# Patient Record
Sex: FEMALE | Race: WHITE | Employment: FULL TIME | URBAN - METROPOLITAN AREA
[De-identification: names, ages, dates, MRNs, and addresses within clinical notes are randomized per-mention and may not be internally consistent; named-entity substitution may affect disease eponyms.]

---

## 2017-06-12 ENCOUNTER — HOSPITAL ENCOUNTER (EMERGENCY)
Age: 37
Discharge: HOME | End: 2017-06-12
Attending: EMERGENCY MEDICINE
Payer: SELF-PAY

## 2017-06-12 VITALS
SYSTOLIC BLOOD PRESSURE: 141 MMHG | HEART RATE: 87 BPM | HEIGHT: 66 IN | WEIGHT: 145 LBS | TEMPERATURE: 98.2 F | RESPIRATION RATE: 16 BRPM | OXYGEN SATURATION: 97 % | BODY MASS INDEX: 23.3 KG/M2 | DIASTOLIC BLOOD PRESSURE: 72 MMHG

## 2017-06-12 DIAGNOSIS — R07.0 THROAT PAIN: Primary | ICD-10-CM

## 2017-06-12 PROCEDURE — 76010000093 HC SPECIAL PROCEDURE

## 2017-06-12 PROCEDURE — 99283 EMERGENCY DEPT VISIT LOW MDM: CPT

## 2017-06-12 PROCEDURE — 74011000250 HC RX REV CODE- 250: Performed by: EMERGENCY MEDICINE

## 2017-06-12 PROCEDURE — 74011250637 HC RX REV CODE- 250/637: Performed by: EMERGENCY MEDICINE

## 2017-06-12 PROCEDURE — 77030037378 HC BRONCHOSCOPE DISP TRAN -D

## 2017-06-12 RX ORDER — OXYMETAZOLINE HCL 0.05 %
2 SPRAY, NON-AEROSOL (ML) NASAL
Status: DISCONTINUED | OUTPATIENT
Start: 2017-06-12 | End: 2017-06-12 | Stop reason: HOSPADM

## 2017-06-12 RX ADMIN — LIDOCAINE HYDROCHLORIDE 40 ML: 20 SOLUTION ORAL; TOPICAL at 13:33

## 2017-06-12 NOTE — ED PROVIDER NOTES
HPI Comments: 44 y.o. female with past medical history significant for seizures who presents to the ED with chief complaint of throat pain. Pt reports she was eating a TV dinner with chicken 2 days ago (pulled the chicken off of the bone with a fork) when she felt like she got a piece lodged in her throat. Pt reports she was \"able to get a piece of the chicken up\" initially and then had an episode where she \"felt like she was unable to breathe and then vomited it all up. \" Pt reports she has had irritation in her throat since that is worse with swallowing. Pt states she is able to tolerate PO intake but it is painful to swallow (mostly on the right side). Pt states her voice is also hoarse. Denies any further episodes of vomiting. Pt states she has taken Tylenol and Aleve and gargled salt water without relief. Pt denies hemoptysis or hematemesis. There are no other acute medical complaints voiced at this time. PCP: Yunior Evans MD    Note written by Cheo Lynn, as dictated by Owen Tolliver MD 1:18 PM     The history is provided by the patient. Past Medical History:   Diagnosis Date    Hx of headache     Seizures ([address]        Past Surgical History:   Procedure Laterality Date    HX APPENDECTOMY      HX GYN      tubal ligation         History reviewed. No pertinent family history. Social History     Social History    Marital status: UNKNOWN     Spouse name: N/A    Number of children: N/A    Years of education: N/A     Occupational History    Not on file. Social History Main Topics    Smoking status: Current Every Day Smoker    Smokeless tobacco: Not on file    Alcohol use No    Drug use: Not on file    Sexual activity: Not on file     Other Topics Concern    Not on file     Social History Narrative         ALLERGIES: Review of patient's allergies indicates no known allergies. Review of Systems   Constitutional: Negative.   Negative for appetite change, fever and unexpected weight change. HENT: Positive for voice change (hoarse). Negative for ear pain, hearing loss, nosebleeds and rhinorrhea.         +throat irritation  +pain with swallowing   Respiratory: Negative. Negative for cough and chest tightness. Cardiovascular: Negative. Negative for chest pain and palpitations. Gastrointestinal: Negative. Negative for abdominal distention, abdominal pain and blood in stool. Endocrine: Negative. Genitourinary: Negative for dysuria and hematuria. Musculoskeletal: Negative. Negative for back pain and myalgias. Skin: Negative. Negative for rash. Allergic/Immunologic: Negative. Neurological: Negative. Negative for dizziness, syncope, weakness and numbness. Hematological: Negative. Psychiatric/Behavioral: Negative. All other systems reviewed and are negative. Vitals:    06/12/17 1248   BP: 141/72   Pulse: 87   Resp: 16   Temp: 98.2 °F (36.8 °C)   SpO2: 97%   Weight: 65.8 kg (145 lb)   Height: 5' 6\" (1.676 m)            Physical Exam   Constitutional: She is oriented to person, place, and time. She appears well-developed and well-nourished. No distress. HENT:   Head: Normocephalic and atraumatic. Right Ear: External ear normal.   Left Ear: External ear normal.   Nose: Nose normal.   Mouth/Throat: Oropharynx is clear and moist.   Eyes: Conjunctivae and EOM are normal. Pupils are equal, round, and reactive to light. Neck: Normal range of motion. Neck supple. No JVD present. No thyromegaly present. Cardiovascular: Normal rate, regular rhythm, normal heart sounds and intact distal pulses. No murmur heard. Pulmonary/Chest: Effort normal and breath sounds normal. No respiratory distress. She has no wheezes. She has no rales. Abdominal: Soft. Bowel sounds are normal. She exhibits no distension. There is no tenderness. Musculoskeletal: Normal range of motion. She exhibits no edema.    Neurological: She is alert and oriented to person, place, and time. No cranial nerve deficit. Skin: Skin is warm and dry. No rash noted. Psychiatric: She has a normal mood and affect. Her behavior is normal. Thought content normal.   Nursing note and vitals reviewed. Note written by Cheo Alvarado, as dictated by Suhas Pitts MD 1:19 PM    [facility]  ED Course       Procedures    PROGRESS NOTE:  2:41 PM   Performed bedside fiberoptic nasopharyngoscopy. Entire larynx visualized. No foreign bodies noted. No obvious tissue inflammation. Vocal cords, epiglottis, and surrounding structures show no edema, erythema, or asymmetry. A/P: Throat irritation following food bolus impaction. Will discharge home with magic mouthwash and f/u with ENT in 1 week if still symptomatic.

## 2018-05-02 ENCOUNTER — OFFICE VISIT (OUTPATIENT)
Dept: FAMILY MEDICINE CLINIC | Age: 38
End: 2018-05-02

## 2018-05-02 VITALS
HEIGHT: 66 IN | WEIGHT: 147.5 LBS | DIASTOLIC BLOOD PRESSURE: 72 MMHG | OXYGEN SATURATION: 97 % | HEART RATE: 86 BPM | TEMPERATURE: 98.1 F | SYSTOLIC BLOOD PRESSURE: 126 MMHG | RESPIRATION RATE: 16 BRPM | BODY MASS INDEX: 23.7 KG/M2

## 2018-05-02 DIAGNOSIS — R51.9 RIGHT FACIAL PAIN: Primary | ICD-10-CM

## 2018-05-02 DIAGNOSIS — G43.809 OTHER MIGRAINE WITHOUT STATUS MIGRAINOSUS, NOT INTRACTABLE: ICD-10-CM

## 2018-05-02 DIAGNOSIS — R56.9 SEIZURE (HCC): ICD-10-CM

## 2018-05-02 PROBLEM — G43.909 MIGRAINE: Status: ACTIVE | Noted: 2018-05-02

## 2018-05-02 RX ORDER — NAPROXEN 500 MG/1
500 TABLET ORAL 2 TIMES DAILY WITH MEALS
Qty: 60 TAB | Refills: 2 | Status: SHIPPED | OUTPATIENT
Start: 2018-05-02 | End: 2021-04-19 | Stop reason: ALTCHOICE

## 2018-05-02 RX ORDER — CEPHALEXIN 500 MG/1
1000 CAPSULE ORAL 2 TIMES DAILY
Qty: 40 CAP | Refills: 0 | Status: SHIPPED | OUTPATIENT
Start: 2018-05-02 | End: 2018-05-12

## 2018-05-02 NOTE — PROGRESS NOTES
Chief Complaint   Patient presents with    Facial Swelling    Nausea     dizziness, restless leg    Migraine     1. Have you been to the ER, urgent care clinic since your last visit? Hospitalized since your last visit? No    2. Have you seen or consulted any other health care providers outside of the [facility] since your last visit? Include any pap smears or colon screening. No      Has hx of szs and ? Having episode, no rx and not seen neuro  In years    R facial swelling and tooth pain      Chief Complaint   Patient presents with    Facial Swelling    Nausea     dizziness, restless leg    Migraine     She is a 43 y.o. female who presents for evalution. Reviewed PmHx, RxHx, FmHx, SocHx, AllgHx and updated and dated in the chart. Patient Active Problem List    Diagnosis    Seizure ([address]    Migraine       Review of Systems - negative except as listed above in the HPI    Objective:     Vitals:    05/02/18 1516   BP: 126/72   Pulse: 86   Resp: 16   Temp: 98.1 °F (36.7 °C)   TempSrc: Oral   SpO2: 97%   Weight: 147 lb 8 oz (66.9 kg)   Height: 5' 6\" (1.676 m)     Physical Examination: General appearance - alert, well appearing, and in no distress  R max sinus tender     Assessment/ Plan:   Diagnoses and all orders for this visit:    1. Right facial pain  -     cephALEXin (KEFLEX) 500 mg capsule; Take 2 Caps by mouth two (2) times a day for 10 days. 2. Other migraine without status migrainosus, not intractable  -     [facility] Neurology ref [facility]  -     naproxen (NAPROSYN) 500 mg tablet; Take 1 Tab by mouth two (2) times daily (with meals). 3. Seizure ([address]  -     [facility] Neurology ref [facility]       Follow-up Disposition:  Return if symptoms worsen or fail to improve. I have discussed the diagnosis with the patient and the intended plan as seen in the above orders. The patient understands and agrees with the plan.  The patient has received an after-visit summary and questions were answered concerning future plans. Medication Side Effects and Warnings were discussed with patient  Patient Labs were reviewed and or requested:  Patient Past Records were reviewed and or requested    Shiva Fatima M.D. There are no Patient Instructions on file for this visit.

## 2018-05-02 NOTE — MR AVS SNAPSHOT
[address] [address] 
[phone] Patient: Roz Olivera MRN: [id number] :1980 Visit Information Date & Time Provider Department Dept. Phone Encounter #  
 2018  2:40 PM An Mishra MD [address] [facility] [phone] [id number] Follow-up Instructions Return if symptoms worsen or fail to improve. Upcoming Health Maintenance Date Due Pneumococcal 19-64 Medium Risk (1 of 1 - PPSV23) 1998 DTaP/Tdap/Td series (1 - Tdap) 2000 PAP AKA CERVICAL CYTOLOGY 2000 Influenza Age 5 to Adult 2018 Allergies as of 2018  Review Complete On: 2018 By: An Mishra MD  
 No Known Allergies Current Immunizations  Reviewed on 2016 No immunizations on file. Not reviewed this visit You Were Diagnosed With   
  
 Codes Comments Right facial pain    -  Primary ICD-10-CM: W29 ICD-9-CM: 784.0 Other migraine without status migrainosus, not intractable     ICD-10-CM: C74.709 ICD-9-CM: 346.80 Seizure ([facility] [address]     ICD-10-CM: R56.9 ICD-9-CM: 780.39 Vitals BP Pulse Temp Resp Height(growth percentile) Weight(growth percentile) 126/72 86 98.1 °F (36.7 °C) (Oral) 16 5' 6\" (1.676 m) 147 lb 8 oz (66.9 kg) LMP SpO2 BMI OB Status Smoking Status 2018 97% 23.81 kg/m2 Having regular periods Light Tobacco Smoker Vitals History BMI and BSA Data Body Mass Index Body Surface Area  
 23.81 kg/m 2 1.77 m 2 Preferred Pharmacy Pharmacy Name Phone [facility], [address] [phone] Your Updated Medication List  
  
   
This list is accurate as of 18  3:39 PM.  Always use your most recent med list.  
  
  
  
  
 cephALEXin 500 mg capsule Commonly known as:  Hanane Mcknezie Take 2 Caps by mouth two (2) times a day for 10 days. Prescriptions Sent to Pharmacy Refills  
 cephALEXin (KEFLEX) 500 mg capsule 0 Sig: Take 2 Caps by mouth two (2) times a day for 10 days. Class: Normal  
 Pharmacy: [facility] [address] [place] [facility], [address]  #: [phone] Route: Oral  
  
We Performed the Following REFERRAL TO NEUROLOGY [SCC18 Custom] Follow-up Instructions Return if symptoms worsen or fail to improve. Referral Information Referral ID Referred By Referred To  
  
 [id number] Antony LOPEZ MD [address], [address] [facility] Phone: [phone] Fax: [phone] Visits Status Start Date End Date 1 New Request 5/2/18 5/2/19 If your referral has a status of pending review or denied, additional information will be sent to support the outcome of this decision. Introducing [facility]! Dear Lizette Schroeder: Thank you for requesting a [employer] account. Our records indicate that you already have an active [employer] account. You can access your account anytime at [email or web address] Did you know that you can access your hospital and ER discharge instructions at any time in [employer]? You can also review all of your test results from your hospital stay or ER visit. Additional Information If you have questions, please visit the Frequently Asked Questions section of the [employer] website at [email or web address] Remember, [employer] is NOT to be used for urgent needs. For medical emergencies, dial 911. Now available from your iPhone and Android! Please provide this summary of care documentation to your next provider. Your primary care clinician is listed as WARD LOPEZ. If you have any questions after today's visit, please call [phone].

## 2018-05-21 ENCOUNTER — OFFICE VISIT (OUTPATIENT)
Dept: NEUROLOGY | Age: 38
End: 2018-05-21

## 2018-05-21 VITALS
DIASTOLIC BLOOD PRESSURE: 80 MMHG | WEIGHT: 143 LBS | BODY MASS INDEX: 22.98 KG/M2 | SYSTOLIC BLOOD PRESSURE: 130 MMHG | OXYGEN SATURATION: 96 % | RESPIRATION RATE: 14 BRPM | HEART RATE: 80 BPM | HEIGHT: 66 IN

## 2018-05-21 DIAGNOSIS — G43.109 MIGRAINE WITH AURA AND WITHOUT STATUS MIGRAINOSUS, NOT INTRACTABLE: Primary | ICD-10-CM

## 2018-05-21 DIAGNOSIS — R42 VERTIGO: ICD-10-CM

## 2018-05-21 DIAGNOSIS — Z86.69 HISTORY OF EPILEPSY: ICD-10-CM

## 2018-05-21 RX ORDER — TOPIRAMATE 50 MG/1
100 TABLET, FILM COATED ORAL DAILY
Qty: 60 TAB | Refills: 5 | Status: SHIPPED | OUTPATIENT
Start: 2018-05-21 | End: 2018-08-27 | Stop reason: SDUPTHER

## 2018-05-21 NOTE — MR AVS SNAPSHOT
[address] [id number] [facility] [address] 
[phone] Patient: Belgica Rogers MRN: [id number] :1980 Visit Information Date & Time Provider Department Dept. Phone Encounter #  
 2018  1:00 PM Kerri Ward MD [facility] [phone] [id number] Follow-up Instructions Return in about 3 months (around 2018). Your Appointments 2018  2:40 PM  
Follow Up with Kerri Ward MD  
[address] [facility] [address] [facility] [phone] [address]  
[facility] [address] [address] Upcoming Health Maintenance Date Due Pneumococcal 19-64 Medium Risk (1 of 1 - PPSV23) 1998 DTaP/Tdap/Td series (1 - Tdap) 2000 PAP AKA CERVICAL CYTOLOGY 2000 Influenza Age 5 to Adult 2018 Allergies as of 2018  Review Complete On: 2018 By: Ash Cuevas MD  
 No Known Allergies Current Immunizations  Reviewed on 2016 No immunizations on file. Not reviewed this visit You Were Diagnosed With   
  
 Codes Comments Migraine with aura and without status migrainosus, not intractable    -  Primary ICD-10-CM: G43.109 ICD-9-CM: 346.00 Vertigo     ICD-10-CM: Q30 ICD-9-CM: 780.4 History of epilepsy     ICD-10-CM: Z86.69 
ICD-9-CM: V12.49 Vitals BP Height(growth percentile) Weight(growth percentile) LMP BMI OB Status 130/80 5' 6\" (1.676 m) 143 lb (64.9 kg) 2018 23.08 kg/m2 Having regular periods Smoking Status Light Tobacco Smoker Vitals History BMI and BSA Data Body Mass Index Body Surface Area 23.08 kg/m 2 1.74 m 2 Preferred Pharmacy Pharmacy Name Phone [facility] [place] [facility], [address] [phone] Your Updated Medication List  
  
   
This list is accurate as of 5/21/18  1:50 PM.  Always use your most recent med list.  
  
  
  
  
 naproxen 500 mg tablet Commonly known as:  NAPROSYN Take 1 Tab by mouth two (2) times daily (with meals). topiramate 50 mg tablet Commonly known as:  TOPAMAX Take 2 Tabs by mouth daily. Prescriptions Printed Refills  
 topiramate (TOPAMAX) 50 mg tablet 5 Sig: Take 2 Tabs by mouth daily. Class: Print Route: Oral  
  
We Performed the Following CBC WITH AUTOMATED DIFF [87705 CPT(R)] METABOLIC PANEL, COMPREHENSIVE [16696 CPT(R)] VITAMIN B12 & FOLATE [96627 CPT(R)] Follow-up Instructions Return in about 3 months (around 8/21/2018). To-Do List   
 05/22/2018 Neurology:  EEG   
  
 05/22/2018 Imaging:  MRI BRAIN W WO CONT   
  
 05/22/2018 Lab:  TSH 3RD GENERATION Patient Instructions Oleksandr Rayo 1399 What is a living will? A living will is a legal form you use to write down the kind of care you want at the end of your life. It is used by the health professionals who will treat you if you aren't able to decide for yourself. If you put your wishes in writing, your loved ones and others will know what kind of care you want. They won't need to guess. This can ease your mind and be helpful to others. A living will is not the same as an estate or property will. An estate will explains what you want to happen with your money and property after you die. Is a living will a legal document? A living will is a legal document. Each state has its own laws about living ledesma. If you move to another state, make sure that your living will is legal in the state where you now live. Or you might use a universal form that has been approved by many states. This kind of form can sometimes be completed and stored online.  Your electronic copy will then be available wherever you have a connection to the Internet. In most cases, doctors will respect your wishes even if you have a form from a different state. · You don't need an  to complete a living will. But legal advice can be helpful if your state's laws are unclear, your health history is complicated, or your family can't agree on what should be in your living will. · You can change your living will at any time. Some people find that their wishes about end-of-life care change as their health changes. · In addition to making a living will, think about completing a medical power of  form. This form lets you name the person you want to make end-of-life treatment decisions for you (your \"health care agent\") if you're not able to. Many hospitals and nursing homes will give you the forms you need to complete a living will and a medical power of . · Your living will is used only if you can't make or communicate decisions for yourself anymore. If you become able to make decisions again, you can accept or refuse any treatment, no matter what you wrote in your living will. · Your state may offer an online registry. This is a place where you can store your living will online so the doctors and nurses who need to treat you can find it right away. What should you think about when creating a living will? Talk about your end-of-life wishes with your family members and your doctor. Let them know what you want. That way the people making decisions for you won't be surprised by your choices. Think about these questions as you make your living will: · Do you know enough about life support methods that might be used? If not, talk to your doctor so you know what might be done if you can't breathe on your own, your heart stops, or you're unable to swallow.  
· What things would you still want to be able to do after you receive life-support methods? Would you want to be able to walk? To speak? To eat on your own? To live without the help of machines? · If you have a choice, where do you want to be cared for? In your home? At a hospital or nursing home? · Do you want certain Latter day practices performed if you become very ill? · If you have a choice at the end of your life, where would you prefer to die? At home? In a hospital or nursing home? Somewhere else? · Would you prefer to be buried or cremated? · Do you want your organs to be donated after you die? What should you do with your living will? · Make sure that your family members and your health care agent have copies of your living will. · Give your doctor a copy of your living will to keep in your medical record. If you have more than one doctor, make sure that each one has a copy. · You may want to put a copy of your living will where it can be easily found. Where can you learn more? Go to http://jessica-handy.info/. Enter A652 in the search box to learn more about \"Learning About Living Perroeliel. \" Current as of: September 24, 2016 Content Version: 11.4 © 1980-8638 [employer]. Care instructions adapted under license by [employer] (which disclaims liability or warranty for this information). If you have questions about a medical condition or this instruction, always ask your healthcare professional. [place] [address] any warranty or liability for your use of this information. Advance Directives: Care Instructions Your Care Instructions An advance directive is a legal way to state your wishes at the end of your life. It tells your family and your doctor what to do if you can no longer say what you want. There are two main types of advance directives. You can change them any time that your wishes change.  
· A living will tells your family and your doctor your wishes about life support and other treatment. · A durable power of  for health care lets you name a person to make treatment decisions for you when you can't speak for yourself. This person is called a health care agent. If you do not have an advance directive, decisions about your medical care may be made by a doctor or a  who doesn't know you. It may help to think of an advance directive as a gift to the people who care for you. If you have one, they won't have to make tough decisions by themselves. Follow-up care is a key part of your treatment and safety. Be sure to make and go to all appointments, and call your doctor if you are having problems. It's also a good idea to know your test results and keep a list of the medicines you take. How can you care for yourself at home? · Discuss your wishes with your loved ones and your doctor. This way, there are no surprises. · Many states have a unique form. Or you might use a universal form that has been approved by many states. This kind of form can sometimes be completed and stored online. Your electronic copy will then be available wherever you have a connection to the Internet. In most cases, doctors will respect your wishes even if you have a form from a different state. · You don't need a  to do an advance directive. But you may want to get legal advice. · Think about these questions when you prepare an advance directive: ¨ Who do you want to make decisions about your medical care if you are not able to? Many people choose a family member or close friend. ¨ Do you know enough about life support methods that might be used? If not, talk to your doctor so you understand. ¨ What are you most afraid of that might happen? You might be afraid of having pain, losing your independence, or being kept alive by machines. ¨ Where would you prefer to die? Choices include your home, a hospital, or a nursing home. ¨ Would you like to have information about hospice care to support you and your family? ¨ Do you want to donate organs when you die? ¨ Do you want certain Mormonism practices performed before you die? If so, put your wishes in the advance directive. · Read your advance directive every year, and make changes as needed. When should you call for help? Be sure to contact your doctor if you have any questions. Where can you learn more? Go to http://jessica-handy.info/. Enter R264 in the search box to learn more about \"Advance Directives: Care Instructions. \" Current as of: September 24, 2016 Content Version: 11.4 © 6251-9569 [employer]. Care instructions adapted under license by [employer] (which disclaims liability or warranty for this information). If you have questions about a medical condition or this instruction, always ask your healthcare professional. [place] [address] any warranty or liability for your use of this information. PRESCRIPTION REFILL POLICY [facility] Statement to Patients April 1, 2014 In an effort to ensure the large volume of patient prescription refills is processed in the most efficient and expeditious manner, we are asking our patients to assist us by calling your Pharmacy for all prescription refills, this will include also your  Mail Order Pharmacy. The pharmacy will contact our office electronically to continue the refill process. Please do not wait until the last minute to call your pharmacy. We need at least 48 hours (2days) to fill prescriptions. We also encourage you to call your pharmacy before going to  your prescription to make sure it is ready.   
 
With regard to controlled substance prescription refill requests (narcotic refills) that need to be picked up at our office, we ask your cooperation by providing us with at least 72 hours (3days) notice that you will need a refill. We will not refill narcotic prescription refill requests after 4:00pm on any weekday, Monday through Thursday, or after 2:00pm on Fridays, or on the weekends. We encourage everyone to explore another way of getting your prescription refill request processed using [employer], our patient web portal through our electronic medical record system. [employer]ato is an efficient and effective way to communicate your medication request directly to the office and  downloadable as an frankie on your smart phone . [employer] also features a review functionality that allows you to view your medication list as well as leave messages for your physician. Are you ready to get connected? If so please review the attatched instructions or speak to any of our staff to get you set up right away! Thank you so much for your cooperation. Should you have any questions please contact our Practice Administrator. The Physicians and Staff,  [facility] Patient Instruction Plan/ Result Policy If we have ordered testing for you, know that; \"NO NEWS IS GOOD NEWS! \" It is our policy that we know longer call patients with results, nor do we  give test results over the phone. We schedule follow up appointments so that your results can be discussed in person. This allows you to address any questions you have regarding the results. If something of concern is revealed on your test, we will contact you to discuss the matter and if needed schedule a sooner follow up appointment. Additionally, results may be found by using the My Chart feature and one of our patient service representatives at the  can give you instructions on how to access this feature to utilize our electronic medical record system. Thank you for your understanding. Topamax 50mg tabs Take 1/2 tab at night for one week then Take one tab at night for one week then Take one and 1/2  tabs at night for one week then Take two tabs at night If you have issues with this medication or need an increase in the dosage please call the office for further instructions Topiramate (By mouth) Topiramate (toe-PIR-a-mate) Treats and prevents seizures, and helps prevent migraine headaches. Brand Name(s): Qudexy XR, Topamax, Trokendi XR There may be other brand names for this medicine. When This Medicine Should Not Be Used: This medicine is not right for everyone. Do not use it if you had an allergic reaction to topiramate, or if you are pregnant. How to Use This Medicine:  
Capsule, Long Acting Capsule, Tablet · Take your medicine as directed. Your dose may need to be changed several times to find what works best for you. · Tablet: Swallow whole. Do not break, crush, or chew the tablet. It has a very bitter taste. · Capsule or extended-release capsule: Do not crush or chew the capsule. Swallow whole or open the capsule and pour the medicine into a small amount (1 teaspoon) of soft food, such as applesauce. Swallow the mixture right away without chewing. Do not store the mixture for use at a later time. · Drink extra fluids so you will urinate more often and help prevent kidney problems. · This medicine should come with a Medication Guide. Ask your pharmacist for a copy if you do not have one. · Missed dose: Take a dose as soon as you remember. If it is almost time for your next dose, wait until then and take a regular dose. Do not take extra medicine to make up for a missed dose. If you miss a dose or forget to use your medicine, use it as soon as you can. If your next regular dose of Topamax® is less than 6 hours away, wait until then to use the medicine and skip the missed dose. If you miss more than 1 dose of Topamax®, call your doctor for instructions. · Store the medicine in a closed container at room temperature, away from heat, moisture, and direct light. Drugs and Foods to Avoid: Ask your doctor or pharmacist before using any other medicine, including over-the-counter medicines, vitamins, and herbal products. · Do not drink alcohol with Qudexy XR or Topamax®. Do not drink alcohol for 6 hours before and 6 hours after you take the Trokendi XR capsule. · Some medicines can affect how topiramate works. Tell your doctor if you are using acetazolamide, dichlorphenamide, dichloralphenazone, digoxin, lithium, metformin, zonisamide, other medicine for seizures (such as carbamazepine, phenytoin, valproic acid), or birth control pills. · Tell your doctor if you are using any medicine that makes you sleepy, such as allergy medicine or narcotic pain medicine. Warnings While Using This Medicine: · It is not safe to take this medicine during pregnancy. It could harm an unborn baby. Tell your doctor right away if you become pregnant. · Tell your doctor if you are breastfeeding, or if you have kidney disease, liver disease, glaucoma, lung or breathing problems, osteoporosis, or a history of depression or mood disorders. Tell your doctor if you are on a ketogenic diet (high in fat and low in carbohydrates). · This medicine may cause the following problems: ¨ Eye pain or vision changes, including glaucoma ¨ Changes in body temperature ¨ Metabolic acidosis (too much acid in the blood) ¨ Kidney stones · This medicine may increase depression or thoughts of suicide. Tell your doctor right away if you start to feel more depressed or think about hurting yourself. · This medicine may make you dizzy, drowsy, or tired. Do not drive or do anything else that could be dangerous until you know how this medicine affects you. · Do not stop using this medicine suddenly. Your doctor will need to slowly decrease your dose before you stop it completely. · Your doctor will do lab tests at regular visits to check on the effects of this medicine. Keep all appointments. · Keep all medicine out of the reach of children. Never share your medicine with anyone. Possible Side Effects While Using This Medicine:  
Call your doctor right away if you notice any of these side effects: · Allergic reaction: Itching or hives, swelling in your face or hands, swelling or tingling in your mouth or throat, chest tightness, trouble breathing · Bloody or cloudy urine, painful urination, sudden lower back or stomach pain · Changes in vision, eye pain · Confusion, problems with walking, clumsiness, dizziness, or trouble talking, concentrating, or remembering · Feeling agitated, depressed, nervous, or irritable, thoughts of hurting yourself or others, unusual mood or behavior · Fever, decreased sweating · Numbness, tingling, or burning pain in your hands, arms, legs, or feet · Rapid, deep breathing, loss of appetite, fast or uneven heartbeat · Vomiting, unusual drowsiness, tiredness, or weakness If you notice these less serious side effects, talk with your doctor: · Change in taste · Nausea, diarrhea · Stuffy or runny nose · Weight loss If you notice other side effects that you think are caused by this medicine, tell your doctor. Call your doctor for medical advice about side effects. You may report side effects to FDA at 2-870-YXS-5235 © 2017 [facility] Information is for End User's use only and may not be sold, redistributed or otherwise used for commercial purposes. The above information is an  only. It is not intended as medical advice for individual conditions or treatments. Talk to your doctor, nurse or pharmacist before following any medical regimen to see if it is safe and effective for you. Introducing [facility]! Dear Vickie Pierre: Thank you for requesting a [employer] account. Our records indicate that you already have an active [employer] account. You can access your account anytime at [email or web address]. [email or web address]/[email or web address] Did you know that you can access your hospital and ER discharge instructions at any time in [employer]? You can also review all of your test results from your hospital stay or ER visit. Additional Information If you have questions, please visit the Frequently Asked Questions section of the [employer] website at [email or web address] Remember, [employer] is NOT to be used for urgent needs. For medical emergencies, dial 911. Now available from your iPhone and Android! Please provide this summary of care documentation to your next provider. Your primary care clinician is listed as WARD LOPEZ. If you have any questions after today's visit, please call [phone].

## 2018-05-21 NOTE — LETTER
Dear Garry Ordoñez MD, Thank you for allowing me to see your patient, Suresh Lawson for a neurological consultation. Please see my impression and recommendations as outlined in my note. Sincerely, Harshad Gramajo MD 
[facility] at [facility] Complaint Patient presents with  Seizure  
  feels like she is floating  Head Pain Reviewed record in preparation for visit and have necessary documentation Pt did not bring medication to office visit for review Medication list reviewed and reconciled with patient Information was given to pt on Advanced Directives, Living Will 
opportunity was given for questions NEUROLOGY NEW PATIENT CONSULTATION 
 
REFERRED BY: 
Garry Ordoñez MD 
 
CHIEF COMPLAINT: 
Migraines, seizures, dizziness HISTORY OF PRESENT ILLNESS HISTORY PROVIDED BY: 
Patient Suresh Lawson is a 43 y.o. female who I am asked to see in consultation for migraines, seizures, dizziness. Patient reports a history of epilepsy as a child. She reports this started when she was an infant and lasted until age 2. She was treated by Dr. Toro Manning at [facility]. She was on phenobarbital and Dilantin. At some point in her childhood she was weaned off of this but she is not sure the exact details. She knows that she did have a workup at that time but is not sure what was the cause of her seizures. She does have a son with a history of febrile seizures. No other epilepsy risk factors. As a teenager she did experience migraine headaches. They were mostly hormonally related. In 2007 during her last pregnancy she started having much more difficulty with dizzy spells and headaches. During the pregnancy she had an episode where she felt like her stomach was rising and then she threw herself to the side on a table and tried to call for help and cannot speak for up to 2 minutes.   She denies having any tonic-clonic activity. The baby was checked that was okay. She has not been on any seizure medication or had any other workup at that time. Since that pregnancy she is continued to have dizzy spells, headaches, and spells. Her headaches would normally last 1-2 days but now she is having in 3-4 times per week. They are associated with nausea and vomiting and photophobia. Pain is a 10 out of 10. It can be all over her head. She will go to bed with the pain and still wake up with it. She does experience an aura weight light on the top half of one eye or spots in her vision or shooting stars. This does not occur with every migraine but does occur frequently enough that she notices it. She also has spells where she will see and hear things but her brain will shut off she cannot retain information. The last 5 months her period is changed from 5 days a month to 2 days and with that she has had more frequent headaches. She is also had an episode of a sensation of falling as well as eye twitching. She is a video today showing her left eye deviating and twitching and she reports this happened up 20 times in that particular day. It has not happened since. She has not had any recent imaging or EEGs. She was placed on naproxen daily by her PCP 2 weeks ago. She has not been on any preventative medications or antiepileptic medications. Patient does work as a . She does not drive. 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 Today she denies any focal numbness or weakness. She has had a recent eye exam that was normal. 
PMH Past Medical History:  
Diagnosis Date  Hx of headache  Seizures ([address] Migraine headaches [facility] Social History Social History  Marital status:  Spouse name: N/A  
 Number of children: N/A  
 Years of education: N/A Social History Main Topics  Smoking status: Light Tobacco Smoker  Smokeless tobacco: Never Used  Alcohol use No  
 Drug use: Not on file  Sexual activity: Not on file Other Topics Concern  Not on file Social History Narrative Steve Couch Family history of sudden with febrile seizures ALLERGIES No Known Allergies CURRENT MEDS Current Outpatient Prescriptions Medication Sig Dispense Refill  topiramate (TOPAMAX) 50 mg tablet Take 2 Tabs by mouth daily. 60 Tab 5  
 naproxen (NAPROSYN) 500 mg tablet Take 1 Tab by mouth two (2) times daily (with meals). 60 Tab 2 REVIEW OF SYSTEMS:  
 
Y  N       Y  N  Y  N   Y  N 
[] [x] AIDS          [] [x] Falls  [] [x] Memory Loss  [] [x]  Shortness of breath 
[] [x] Anxiety          [] [x] Fatigue [] [x] Muscle Pain        [] [x]  Skipped beats 
[] [x] Chest Pain   [x] [] Frequent HA [] [x] Ms Weakness     [] [x]  Snoring 
[] [x] Constipation [] [x]Hearing loss [] [x] Nause/Vomiting  [] [x]  Stomach Pain 
[] [x] Cough          [] [x]Hepatitis [] [x] Neuropathy         [] [x]  Swallowing difficulty 
[] [x] Depression  [] [x]Incontinence [] [x] Poor appetite      [x] []  Vertigo 
[] [x] Diarrhea       [] [x] Joint Pain [] [x] Rash                   [x] []  Visual disturbances 
[] [x] Fainting        [] [x] Leg Swelling [] [x] Ringing ears       [] [x]  Weight changes []Unable to obtain  ROS due to  []mental status change  []sedated   []intubated PREVIOUS WORKUP IMAGING: None LABS Results for orders placed or performed during the hospital encounter of 03/09/14 CBC WITH AUTOMATED DIFF Result Value Ref Range WBC 12.2 (H) 3.6 - 11.0 K/uL  
 RBC 3.87 3.80 - 5.20 M/uL  
 HGB 11.8 11.5 - 16.0 g/dL HCT 34.1 (L) 35.0 - 47.0 % MCV 88.1 80.0 - 99.0 FL  
 MCH 30.5 26.0 - 34.0 PG  
 MCHC 34.6 30.0 - 36.5 g/dL  
 RDW 13.0 11.5 - 14.5 % PLATELET 379 768 - 052 K/uL NEUTROPHILS 74 32 - 75 % LYMPHOCYTES 16 12 - 49 % MONOCYTES 9 5 - 13 % EOSINOPHILS 1 0 - 7 % BASOPHILS 0 0 - 1 % ABS. NEUTROPHILS 9.1 (H) 1.8 - 8.0 K/UL  
 ABS. LYMPHOCYTES 1.9 0.8 - 3.5 K/UL  
 ABS. MONOCYTES 1.1 (H) 0.0 - 1.0 K/UL  
 ABS. EOSINOPHILS 0.1 0.0 - 0.4 K/UL  
 ABS. BASOPHILS 0.0 0.0 - 0.1 K/UL PHYSICAL EXAM 
Visit Vitals  /80  Ht 5' 6\" (1.676 m)  Wt 64.9 kg (143 lb)  LMP 04/28/2018  BMI 23.08 kg/m2 General:  Alert, cooperative, no distress. Head:  Normocephalic, without obvious abnormality, atraumatic. Eyes:  Conjunctivae/corneas clear. Pupils equal, round, reactive to light. Extraocular movements intact, VFF, NO papilledema Lungs: 
Heart:   Non labored breathing Regular rate and rhythm, no carotid bruits Abdomen:   Soft, non-distended Extremities: Extremities normal, atraumatic, no cyanosis or edema. Pulses: 2+ and symmetric all extremities. Skin: Skin color, texture, turgor normal. No rashes or lesions. Neurologic:  Gen: Attention normal 
           Language: naming, repetition, fluency normal 
           Memory: intact recent and remote memory Cranial Nerves: 
I: smell Not tested II: visual fields Full to confrontation II: pupils Equal, round, reactive to light II: optic disc No papilledema III,VII: ptosis none III,IV,VI: extraocular muscles  Full ROM V: mastication normal  
V: facial light touch sensation  normal  
VII: facial muscle function   symmetric VIII: hearing symmetric IX: soft palate elevation  normal  
XI: trapezius strength  5/5 XI: sternocleidomastoid strength 5/5 XI: neck flexion strength  5/5 XII: tongue  midline Motor: normal bulk and tone, no tremor Strength: 5/5 all four extremities Sensory: Decreased pinprick in the right upper extremity and left lower extremity Coordination: FTN intact, Rhomberg negative Gait: normal gait including tandem Reflexes: 2+ throughout IMPRESSION Sol Shore is a 43 y.o. female who presents for evaluation of migraines, dizzy spells, and spells. She does have a history of epilepsy as a child. This is concerning that she may be having seizures again as an adult. Will do further evaluation with an EEG. Additionally she is having dizzy spells and migraine. I think this could be related. However, structural etiology needs to be evaluated for this as well. Will do an MRI of the brain to evaluate for any vascular or structural etiology of her progressive and worsening migraine headaches. We will also check lab work and start her on a preventative medication for migraines. RECOMMENDATIONS Problem List Items Addressed This Visit Migraine - Primary Relevant Medications · Will start topiramate (TOPAMAX) 50 mg tablet at one half tab and titrate to 100 mg. Side effects discussed · We will do naproxen for abortive but limited to no more than 3 times per week Other Relevant Orders · MRI BRAIN W WO CONT · EEG 
· May need to do ambulatory EEG or EMU evaluation · CBC WITH AUTOMATED DIFF  
 · METABOLIC PANEL, COMPREHENSIVE  
 · TSH 3RD GENERATION  
 · VITAMIN B12 & FOLATE Other Visit Diagnoses Vertigo Other Relevant Orders · MRI BRAIN W WO CONT · EEG  
 · CBC WITH AUTOMATED DIFF  
 · METABOLIC PANEL, COMPREHENSIVE  
 · TSH 3RD GENERATION  
 · VITAMIN B12 & FOLATE History of epilepsy Relevant Orders · MRI BRAIN W WO CONT · EEG  
  
 
 
FU in 3 months Pilar Jaime MD 
 
CC: Linh Roth MD 
Fax: [phone] Medications and side effects discussed with patient in detail. With any new medications prescribed, patient was given instructions on administration and side effects. Written medication information was provided to the patient as well. This note was created using voice recognition software. Despite editing, there may be syntax errors. This note will not be viewable in [address]

## 2018-05-21 NOTE — PROGRESS NOTES
NEUROLOGY NEW PATIENT CONSULTATION    REFERRED BY:  Linh Roth MD    CHIEF COMPLAINT:  Migraines, seizures, dizziness    HISTORY OF PRESENT ILLNESS    HISTORY PROVIDED BY:  Patient      Freda García is a 43 y.o. female who I am asked to see in consultation for migraines, seizures, dizziness. Patient reports a history of epilepsy as a child. She reports this started when she was an infant and lasted until age 2. She was treated by Dr. Katy Ji at [facility]. She was on phenobarbital and Dilantin. At some point in her childhood she was weaned off of this but she is not sure the exact details. She knows that she did have a workup at that time but is not sure what was the cause of her seizures. She does have a son with a history of febrile seizures. No other epilepsy risk factors. As a teenager she did experience migraine headaches. They were mostly hormonally related. In 2007 during her last pregnancy she started having much more difficulty with dizzy spells and headaches. During the pregnancy she had an episode where she felt like her stomach was rising and then she threw herself to the side on a table and tried to call for help and cannot speak for up to 2 minutes. She denies having any tonic-clonic activity. The baby was checked that was okay. She has not been on any seizure medication or had any other workup at that time. Since that pregnancy she is continued to have dizzy spells, headaches, and spells. Her headaches would normally last 1-2 days but now she is having in 3-4 times per week. They are associated with nausea and vomiting and photophobia. Pain is a 10 out of 10. It can be all over her head. She will go to bed with the pain and still wake up with it. She does experience an aura weight light on the top half of one eye or spots in her vision or shooting stars. This does not occur with every migraine but does occur frequently enough that she notices it.     She also has spells where she will see and hear things but her brain will shut off she cannot retain information. The last 5 months her period is changed from 5 days a month to 2 days and with that she has had more frequent headaches. She is also had an episode of a sensation of falling as well as eye twitching. She is a video today showing her left eye deviating and twitching and she reports this happened up 20 times in that particular day. It has not happened since. She has not had any recent imaging or EEGs. She was placed on naproxen daily by her PCP 2 weeks ago. She has not been on any preventative medications or antiepileptic medications. Patient does work as a . She does not drive. 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 Today she denies any focal numbness or weakness. She has had a recent eye exam that was normal.  PMH  Past Medical History:   Diagnosis Date    Hx of headache     Seizures (HCC)    Migraine headaches    SH  Social History     Social History    Marital status:      Spouse name: N/A    Number of children: N/A    Years of education: N/A     Social History Main Topics    Smoking status: Light Tobacco Smoker    Smokeless tobacco: Never Used    Alcohol use No    Drug use: Not on file    Sexual activity: Not on file     Other Topics Concern    Not on file     Social History Narrative       FH  Family history of sudden with febrile seizures    ALLERGIES  No Known Allergies    CURRENT MEDS  Current Outpatient Prescriptions   Medication Sig Dispense Refill    topiramate (TOPAMAX) 50 mg tablet Take 2 Tabs by mouth daily. 60 Tab 5    naproxen (NAPROSYN) 500 mg tablet Take 1 Tab by mouth two (2) times daily (with meals).  60 Tab 2       REVIEW OF SYSTEMS:     Y  N       Y  N  Y  N   Y  N  [] [x] AIDS          [] [x] Falls  [] [x] Memory Loss  [] [x]  Shortness of breath  [] [x] Anxiety          [] [x] Fatigue [] [x] Muscle Pain        [] [x]  Skipped beats  [] [x] Chest Pain   [x] [] Frequent HA [] [x] Ms Weakness     [] [x]  Snoring  [] [x] Constipation [] [x]Hearing loss [] [x] Nause/Vomiting  [] [x]  Stomach Pain  [] [x] Cough          [] [x]Hepatitis [] [x] Neuropathy         [] [x]  Swallowing difficulty  [] [x] Depression  [] [x]Incontinence [] [x] Poor appetite      [x] []  Vertigo  [] [x] Diarrhea       [] [x] Joint Pain [] [x] Rash                   [x] []  Visual disturbances  [] [x] Fainting        [] [x] Leg Swelling [] [x] Ringing ears       [] [x]  Weight changes      []Unable to obtain  ROS due to  []mental status change  []sedated   []intubated          PREVIOUS WORKUP  IMAGING: None      LABS  Results for orders placed or performed during the hospital encounter of 03/09/14   CBC WITH AUTOMATED DIFF   Result Value Ref Range    WBC 12.2 (H) 3.6 - 11.0 K/uL    RBC 3.87 3.80 - 5.20 M/uL    HGB 11.8 11.5 - 16.0 g/dL    HCT 34.1 (L) 35.0 - 47.0 %    MCV 88.1 80.0 - 99.0 FL    MCH 30.5 26.0 - 34.0 PG    MCHC 34.6 30.0 - 36.5 g/dL    RDW 13.0 11.5 - 14.5 %    PLATELET 189 477 - 124 K/uL    NEUTROPHILS 74 32 - 75 %    LYMPHOCYTES 16 12 - 49 %    MONOCYTES 9 5 - 13 %    EOSINOPHILS 1 0 - 7 %    BASOPHILS 0 0 - 1 %    ABS. NEUTROPHILS 9.1 (H) 1.8 - 8.0 K/UL    ABS. LYMPHOCYTES 1.9 0.8 - 3.5 K/UL    ABS. MONOCYTES 1.1 (H) 0.0 - 1.0 K/UL    ABS. EOSINOPHILS 0.1 0.0 - 0.4 K/UL    ABS. BASOPHILS 0.0 0.0 - 0.1 K/UL       PHYSICAL EXAM  Visit Vitals    /80    Ht 5' 6\" (1.676 m)    Wt 64.9 kg (143 lb)    LMP 04/28/2018    BMI 23.08 kg/m2     General:  Alert, cooperative, no distress. Head:  Normocephalic, without obvious abnormality, atraumatic. Eyes:  Conjunctivae/corneas clear. Pupils equal, round, reactive to light.  Extraocular movements intact, VFF, NO papilledema   Lungs:  Heart:   Non labored breathing  Regular rate and rhythm, no carotid bruits   Abdomen:   Soft, non-distended   Extremities: Extremities normal, atraumatic, no cyanosis or edema.   Pulses: 2+ and symmetric all extremities. Skin: Skin color, texture, turgor normal. No rashes or lesions. Neurologic:  Gen: Attention normal             Language: naming, repetition, fluency normal             Memory: intact recent and remote memory  Cranial Nerves:  I: smell Not tested   II: visual fields Full to confrontation   II: pupils Equal, round, reactive to light   II: optic disc No papilledema   III,VII: ptosis none   III,IV,VI: extraocular muscles  Full ROM   V: mastication normal   V: facial light touch sensation  normal   VII: facial muscle function   symmetric   VIII: hearing symmetric   IX: soft palate elevation  normal   XI: trapezius strength  5/5   XI: sternocleidomastoid strength 5/5   XI: neck flexion strength  5/5   XII: tongue  midline     Motor: normal bulk and tone, no tremor              Strength: 5/5 all four extremities  Sensory: Decreased pinprick in the right upper extremity and left lower extremity  Coordination: FTN intact, Rhomberg negative  Gait: normal gait including tandem   Reflexes: 2+ throughout       Claude Leaver is a 43 y.o. female who presents for evaluation of migraines, dizzy spells, and spells. She does have a history of epilepsy as a child. This is concerning that she may be having seizures again as an adult. Will do further evaluation with an EEG. Additionally she is having dizzy spells and migraine. I think this could be related. However, structural etiology needs to be evaluated for this as well. Will do an MRI of the brain to evaluate for any vascular or structural etiology of her progressive and worsening migraine headaches. We will also check lab work and start her on a preventative medication for migraines. RECOMMENDATIONS    Problem List Items Addressed This Visit     Migraine - Primary    Relevant Medications    · Will start topiramate (TOPAMAX) 50 mg tablet at one half tab and titrate to 100 mg.   Side effects discussed  · We will do naproxen for abortive but limited to no more than 3 times per week    Other Relevant Orders    · MRI BRAIN W WO CONT    · EEG  · May need to do ambulatory EEG or EMU evaluation    · CBC WITH AUTOMATED DIFF    · METABOLIC PANEL, COMPREHENSIVE    · TSH 3RD GENERATION    · VITAMIN B12 & FOLATE      Other Visit Diagnoses     Vertigo        Other Relevant Orders    · MRI BRAIN W WO CONT    · EEG    · CBC WITH AUTOMATED DIFF    · METABOLIC PANEL, COMPREHENSIVE    · TSH 3RD GENERATION    · VITAMIN B12 & FOLATE    History of epilepsy        Relevant Orders    · MRI BRAIN W WO CONT    · EEG          FU in 3 months    Lakisha Bang MD    CC: Ellie Riddle MD  Fax: [phone]    Medications and side effects discussed with patient in detail. With any new medications prescribed, patient was given instructions on administration and side effects. Written medication information was provided to the patient as well. This note was created using voice recognition software. Despite editing, there may be syntax errors. This note will not be viewable in [address]

## 2018-05-21 NOTE — PROGRESS NOTES
Chief Complaint   Patient presents with    Seizure     feels like she is floating    Head Pain       Reviewed record in preparation for visit and have necessary documentation  Pt did not bring medication to office visit for review  Medication list reviewed and reconciled with patient  Information was given to pt on Advanced Directives, Living Will  opportunity was given for questions

## 2018-05-21 NOTE — PATIENT INSTRUCTIONS
Learning About Living Kamlesh  What is a living will? A living will is a legal form you use to write down the kind of care you want at the end of your life. It is used by the health professionals who will treat you if you aren't able to decide for yourself. If you put your wishes in writing, your loved ones and others will know what kind of care you want. They won't need to guess. This can ease your mind and be helpful to others. A living will is not the same as an estate or property will. An estate will explains what you want to happen with your money and property after you die. Is a living will a legal document? A living will is a legal document. Each state has its own laws about living ledesma. If you move to another state, make sure that your living will is legal in the state where you now live. Or you might use a universal form that has been approved by many states. This kind of form can sometimes be completed and stored online. Your electronic copy will then be available wherever you have a connection to the Internet. In most cases, doctors will respect your wishes even if you have a form from a different state. · You don't need an  to complete a living will. But legal advice can be helpful if your state's laws are unclear, your health history is complicated, or your family can't agree on what should be in your living will. · You can change your living will at any time. Some people find that their wishes about end-of-life care change as their health changes. · In addition to making a living will, think about completing a medical power of  form. This form lets you name the person you want to make end-of-life treatment decisions for you (your \"health care agent\") if you're not able to. Many hospitals and nursing homes will give you the forms you need to complete a living will and a medical power of .   · Your living will is used only if you can't make or communicate decisions for yourself anymore. If you become able to make decisions again, you can accept or refuse any treatment, no matter what you wrote in your living will. · Your state may offer an online registry. This is a place where you can store your living will online so the doctors and nurses who need to treat you can find it right away. What should you think about when creating a living will? Talk about your end-of-life wishes with your family members and your doctor. Let them know what you want. That way the people making decisions for you won't be surprised by your choices. Think about these questions as you make your living will:  · Do you know enough about life support methods that might be used? If not, talk to your doctor so you know what might be done if you can't breathe on your own, your heart stops, or you're unable to swallow. · What things would you still want to be able to do after you receive life-support methods? Would you want to be able to walk? To speak? To eat on your own? To live without the help of machines? · If you have a choice, where do you want to be cared for? In your home? At a hospital or nursing home? · Do you want certain Orthodox practices performed if you become very ill? · If you have a choice at the end of your life, where would you prefer to die? At home? In a hospital or nursing home? Somewhere else? · Would you prefer to be buried or cremated? · Do you want your organs to be donated after you die? What should you do with your living will? · Make sure that your family members and your health care agent have copies of your living will. · Give your doctor a copy of your living will to keep in your medical record. If you have more than one doctor, make sure that each one has a copy. · You may want to put a copy of your living will where it can be easily found. Where can you learn more? Go to http://jessica-handy.info/.   Enter L405 in the search box to learn more about \"Learning About Living Kamlesh. \"  Current as of: September 24, 2016  Content Version: 11.4  © 8790-2357 [employer]. Care instructions adapted under license by [employer] (which disclaims liability or warranty for this information). If you have questions about a medical condition or this instruction, always ask your healthcare professional. [address] any warranty or liability for your use of this information. Advance Directives: Care Instructions  Your Care Instructions  An advance directive is a legal way to state your wishes at the end of your life. It tells your family and your doctor what to do if you can no longer say what you want. There are two main types of advance directives. You can change them any time that your wishes change. · A living will tells your family and your doctor your wishes about life support and other treatment. · A durable power of  for health care lets you name a person to make treatment decisions for you when you can't speak for yourself. This person is called a health care agent. If you do not have an advance directive, decisions about your medical care may be made by a doctor or a  who doesn't know you. It may help to think of an advance directive as a gift to the people who care for you. If you have one, they won't have to make tough decisions by themselves. Follow-up care is a key part of your treatment and safety. Be sure to make and go to all appointments, and call your doctor if you are having problems. It's also a good idea to know your test results and keep a list of the medicines you take. How can you care for yourself at home? · Discuss your wishes with your loved ones and your doctor. This way, there are no surprises. · Many states have a unique form. Or you might use a universal form that has been approved by many states. This kind of form can sometimes be completed and stored online.  Your electronic copy will then be available wherever you have a connection to the Internet. In most cases, doctors will respect your wishes even if you have a form from a different state. · You don't need a  to do an advance directive. But you may want to get legal advice. · Think about these questions when you prepare an advance directive:  ¨ Who do you want to make decisions about your medical care if you are not able to? Many people choose a family member or close friend. ¨ Do you know enough about life support methods that might be used? If not, talk to your doctor so you understand. ¨ What are you most afraid of that might happen? You might be afraid of having pain, losing your independence, or being kept alive by machines. ¨ Where would you prefer to die? Choices include your home, a hospital, or a nursing home. ¨ Would you like to have information about hospice care to support you and your family? ¨ Do you want to donate organs when you die? ¨ Do you want certain Jain practices performed before you die? If so, put your wishes in the advance directive. · Read your advance directive every year, and make changes as needed. When should you call for help? Be sure to contact your doctor if you have any questions. Where can you learn more? Go to http://jessica-handy.info/. Enter R264 in the search box to learn more about \"Advance Directives: Care Instructions. \"  Current as of: September 24, 2016  Content Version: 11.4  © 6789-8248 [employer]. Care instructions adapted under license by [employer] (which disclaims liability or warranty for this information). If you have questions about a medical condition or this instruction, always ask your healthcare professional. [place] [address] any warranty or liability for your use of this information.   [facility]   Statement to Patients  April 1, 2014      In an effort to ensure the large volume of patient prescription refills is processed in the most efficient and expeditious manner, we are asking our patients to assist us by calling your Pharmacy for all prescription refills, this will include also your  Mail Order Pharmacy. The pharmacy will contact our office electronically to continue the refill process. Please do not wait until the last minute to call your pharmacy. We need at least 48 hours (2days) to fill prescriptions. We also encourage you to call your pharmacy before going to  your prescription to make sure it is ready. With regard to controlled substance prescription refill requests (narcotic refills) that need to be picked up at our office, we ask your cooperation by providing us with at least 72 hours (3days) notice that you will need a refill. We will not refill narcotic prescription refill requests after 4:00pm on any weekday, Monday through Thursday, or after 2:00pm on Fridays, or on the weekends. We encourage everyone to explore another way of getting your prescription refill request processed using [employer], our patient web portal through our electronic medical record system. [employer] is an efficient and effective way to communicate your medication request directly to the office and  downloadable as an frankie on your smart phone . [employer] also features a review functionality that allows you to view your medication list as well as leave messages for your physician. Are you ready to get connected? If so please review the attatched instructions or speak to any of our staff to get you set up right away! Thank you so much for your cooperation. Should you have any questions please contact our Practice Administrator. The Physicians and Staff,  [facility]   Patient Instruction Plan/ Result Policy    If we have ordered testing for you, know that; [facility] NEWS IS GOOD NEWS! \" It is our policy that we know longer call patients with results, nor do we  give test results over the phone. We schedule follow up appointments so that your results can be discussed in person. This allows you to address any questions you have regarding the results. If something of concern is revealed on your test, we will contact you to discuss the matter and if needed schedule a sooner follow up appointment. Additionally, results may be found by using the My Chart feature and one of our patient service representatives at the  can give you instructions on how to access this feature to utilize our electronic medical record system. Thank you for your understanding. Topamax 50mg tabs  Take 1/2 tab at night for one week then  Take one tab at night for one week then  Take one and 1/2  tabs at night for one week then  Take two tabs at night    If you have issues with this medication or need an increase in the dosage please call the office for further instructions    Topiramate (By mouth)   Topiramate (toe-PIR-a-mate)  Treats and prevents seizures, and helps prevent migraine headaches. Brand Name(s): Qudexy XR, Topamax, Trokendi XR   There may be other brand names for this medicine. When This Medicine Should Not Be Used: This medicine is not right for everyone. Do not use it if you had an allergic reaction to topiramate, or if you are pregnant. How to Use This Medicine:   Capsule, Long Acting Capsule, Tablet  · Take your medicine as directed. Your dose may need to be changed several times to find what works best for you. · Tablet: Swallow whole. Do not break, crush, or chew the tablet. It has a very bitter taste. · Capsule or extended-release capsule: Do not crush or chew the capsule. Swallow whole or open the capsule and pour the medicine into a small amount (1 teaspoon) of soft food, such as applesauce. Swallow the mixture right away without chewing. Do not store the mixture for use at a later time.   · Drink extra fluids so you will urinate more often and help prevent kidney problems. · This medicine should come with a Medication Guide. Ask your pharmacist for a copy if you do not have one. · Missed dose: Take a dose as soon as you remember. If it is almost time for your next dose, wait until then and take a regular dose. Do not take extra medicine to make up for a missed dose. If you miss a dose or forget to use your medicine, use it as soon as you can. If your next regular dose of Topamax® is less than 6 hours away, wait until then to use the medicine and skip the missed dose. If you miss more than 1 dose of Topamax®, call your doctor for instructions. · Store the medicine in a closed container at room temperature, away from heat, moisture, and direct light. Drugs and Foods to Avoid:   Ask your doctor or pharmacist before using any other medicine, including over-the-counter medicines, vitamins, and herbal products. · Do not drink alcohol with Qudexy XR or Topamax®. Do not drink alcohol for 6 hours before and 6 hours after you take the Trokendi XR capsule. · Some medicines can affect how topiramate works. Tell your doctor if you are using acetazolamide, dichlorphenamide, dichloralphenazone, digoxin, lithium, metformin, zonisamide, other medicine for seizures (such as carbamazepine, phenytoin, valproic acid), or birth control pills. · Tell your doctor if you are using any medicine that makes you sleepy, such as allergy medicine or narcotic pain medicine. Warnings While Using This Medicine:   · It is not safe to take this medicine during pregnancy. It could harm an unborn baby. Tell your doctor right away if you become pregnant. · Tell your doctor if you are breastfeeding, or if you have kidney disease, liver disease, glaucoma, lung or breathing problems, osteoporosis, or a history of depression or mood disorders. Tell your doctor if you are on a ketogenic diet (high in fat and low in carbohydrates).   · This medicine may cause the following problems:  ¨ Eye pain or vision changes, including glaucoma  ¨ Changes in body temperature  ¨ Metabolic acidosis (too much acid in the blood)  ¨ Kidney stones  · This medicine may increase depression or thoughts of suicide. Tell your doctor right away if you start to feel more depressed or think about hurting yourself. · This medicine may make you dizzy, drowsy, or tired. Do not drive or do anything else that could be dangerous until you know how this medicine affects you. · Do not stop using this medicine suddenly. Your doctor will need to slowly decrease your dose before you stop it completely. · Your doctor will do lab tests at regular visits to check on the effects of this medicine. Keep all appointments. · Keep all medicine out of the reach of children. Never share your medicine with anyone. Possible Side Effects While Using This Medicine:   Call your doctor right away if you notice any of these side effects:  · Allergic reaction: Itching or hives, swelling in your face or hands, swelling or tingling in your mouth or throat, chest tightness, trouble breathing  · Bloody or cloudy urine, painful urination, sudden lower back or stomach pain  · Changes in vision, eye pain  · Confusion, problems with walking, clumsiness, dizziness, or trouble talking, concentrating, or remembering  · Feeling agitated, depressed, nervous, or irritable, thoughts of hurting yourself or others, unusual mood or behavior  · Fever, decreased sweating  · Numbness, tingling, or burning pain in your hands, arms, legs, or feet  · Rapid, deep breathing, loss of appetite, fast or uneven heartbeat  · Vomiting, unusual drowsiness, tiredness, or weakness  If you notice these less serious side effects, talk with your doctor:   · Change in taste  · Nausea, diarrhea  · Stuffy or runny nose  · Weight loss  If you notice other side effects that you think are caused by this medicine, tell your doctor.    Call your doctor for medical advice about side effects. You may report side effects to FDA at 1-559-XRA-3845  © 2017 [facility] Information is for End User's use only and may not be sold, redistributed or otherwise used for commercial purposes. The above information is an  only. It is not intended as medical advice for individual conditions or treatments. Talk to your doctor, nurse or pharmacist before following any medical regimen to see if it is safe and effective for you.

## 2018-05-22 DIAGNOSIS — Z86.69 HISTORY OF EPILEPSY: ICD-10-CM

## 2018-05-22 DIAGNOSIS — G43.109 MIGRAINE WITH AURA AND WITHOUT STATUS MIGRAINOSUS, NOT INTRACTABLE: ICD-10-CM

## 2018-05-22 DIAGNOSIS — R42 VERTIGO: ICD-10-CM

## 2018-05-30 ENCOUNTER — HOSPITAL ENCOUNTER (OUTPATIENT)
Dept: NEUROLOGY | Age: 38
Discharge: HOME | End: 2018-05-30
Attending: PSYCHIATRY & NEUROLOGY
Payer: SELF-PAY

## 2018-05-30 ENCOUNTER — HOSPITAL ENCOUNTER (OUTPATIENT)
Dept: MRI IMAGING | Age: 38
Discharge: HOME | End: 2018-05-30
Attending: PSYCHIATRY & NEUROLOGY
Payer: SELF-PAY

## 2018-05-30 DIAGNOSIS — Z86.69 HISTORY OF EPILEPSY: ICD-10-CM

## 2018-05-30 DIAGNOSIS — R42 VERTIGO: ICD-10-CM

## 2018-05-30 DIAGNOSIS — G43.109 MIGRAINE WITH AURA AND WITHOUT STATUS MIGRAINOSUS, NOT INTRACTABLE: ICD-10-CM

## 2018-05-30 DIAGNOSIS — R56.9 SEIZURE (HCC): ICD-10-CM

## 2018-05-30 PROCEDURE — 70553 MRI BRAIN STEM W/O & W/DYE: CPT

## 2018-05-30 PROCEDURE — 74011250636 HC RX REV CODE- 250/636: Performed by: RADIOLOGY

## 2018-05-30 PROCEDURE — A9575 INJ GADOTERATE MEGLUMI 0.1ML: HCPCS | Performed by: RADIOLOGY

## 2018-05-30 PROCEDURE — 95816 EEG AWAKE AND DROWSY: CPT

## 2018-05-30 RX ORDER — GADOTERATE MEGLUMINE 376.9 MG/ML
10 INJECTION INTRAVENOUS
Status: COMPLETED | OUTPATIENT
Start: 2018-05-30 | End: 2018-05-30

## 2018-05-30 RX ADMIN — GADOTERATE MEGLUMINE 10 ML: 376.9 INJECTION INTRAVENOUS at 17:21

## 2018-05-31 LAB
ALBUMIN SERPL-MCNC: 4.8 G/DL (ref 3.5–5.5)
ALBUMIN/GLOB SERPL: 2.2 {RATIO} (ref 1.2–2.2)
ALP SERPL-CCNC: 57 IU/L (ref 39–117)
ALT SERPL-CCNC: 11 IU/L (ref 0–32)
AST SERPL-CCNC: 15 IU/L (ref 0–40)
BILIRUB SERPL-MCNC: 0.3 MG/DL (ref 0–1.2)
BUN SERPL-MCNC: 17 MG/DL (ref 6–20)
BUN/CREAT SERPL: 22 (ref 9–23)
CALCIUM SERPL-MCNC: 9.7 MG/DL (ref 8.7–10.2)
CHLORIDE SERPL-SCNC: 103 MMOL/L (ref 96–106)
CO2 SERPL-SCNC: 23 MMOL/L (ref 18–29)
CREAT SERPL-MCNC: 0.77 MG/DL (ref 0.57–1)
FOLATE SERPL-MCNC: 7.2 NG/ML
GFR SERPLBLD CREATININE-BSD FMLA CKD-EPI: 112 ML/MIN/1.73
GFR SERPLBLD CREATININE-BSD FMLA CKD-EPI: 98 ML/MIN/1.73
GLOBULIN SER CALC-MCNC: 2.2 G/DL (ref 1.5–4.5)
GLUCOSE SERPL-MCNC: 117 MG/DL (ref 65–99)
POTASSIUM SERPL-SCNC: 4.9 MMOL/L (ref 3.5–5.2)
PROT SERPL-MCNC: 7 G/DL (ref 6–8.5)
SODIUM SERPL-SCNC: 141 MMOL/L (ref 134–144)
TSH SERPL DL<=0.005 MIU/L-ACNC: 2.28 UIU/ML (ref 0.45–4.5)
VIT B12 SERPL-MCNC: 478 PG/ML (ref 232–1245)

## 2018-06-01 NOTE — PROCEDURES
Patient Name: Gama Doe  : 1980  Age: 43 y.o. Ordering physician: Adrian Sen  Date of EE2018  EEG procedure number: [id number]  Woo Chairez  Interpreting physician: Lakisha Bang MD      ELECTROENCEPHALOGRAM REPORT     PROCEDURE: EEG. CLINICAL INDICATION: The patient is a 43 y.o. female with a history of   possible seizures. EEG to rule out seizures, rule out stroke, rule out   cortical abnormality. EEG CLASSIFICATION: Essentially normal    DESCRIPTION OF THE RECORD:   The background of this recording contains a posteriorly-located occipital alpha rhythm of 10 Hz that attenuates with eye opening. Throughout the recording, there were no clear areas of focal slowing nor spike or spike-and-wave discharges seen. Hyperventilation was not performed. Photic stimulation produced a minimal driving response in the posterior head regions. During the recording the patient did not achieve stage II sleep    INTERPRETATION: This is a normal electroencephalogram showing no clear focal abnormalities or epileptiform activity. A normal EEG doesn't not rule out seizures. Clinical correlation recommended.         Adrian Sen MD  2018  8:16 AM

## 2018-07-06 ENCOUNTER — TELEPHONE (OUTPATIENT)
Dept: NEUROLOGY | Age: 38
End: 2018-07-06

## 2018-07-06 DIAGNOSIS — G43.109 MIGRAINE WITH AURA AND WITHOUT STATUS MIGRAINOSUS, NOT INTRACTABLE: Primary | ICD-10-CM

## 2018-07-06 NOTE — TELEPHONE ENCOUNTER
----- Message from Nevaeh Cabrera sent at 7/6/2018  2:48 PM EDT -----  Regarding: Dr. Gurjit Rothman  Pt requested a call from the nurse today regarding her head pain. Best contact number [phone].

## 2018-07-09 NOTE — TELEPHONE ENCOUNTER
Called and spoke with patient and she would like the Imitrex. I just need to know what mg you want to prescribe?

## 2018-07-09 NOTE — TELEPHONE ENCOUNTER
Patient states that on 7/3/18 she had dizziness and then on 7/4/18 she had a migraine. She stated that she was quite and in her solitude state as a way to try and get ride of the migraine. But as the evening progressed she stated that she felt it build up on the R side of her head and she could feel it pulsating and it felt like a balloon was filling up and the pressure would eventually cause it to pop. I asked if she felt a pop and she stated no, but that it was intense pain. I asked if she still had a migraine or head pain and she stated she did not. I told her if that happened again she would be better of going to the ER. She just wanted you to be aware of the situation as she stated you wanted to be informed in the past. She also mentioned a flashing light when the migraine was happening. Please advise.

## 2018-07-09 NOTE — TELEPHONE ENCOUNTER
That was good advice about ED. Does she have anything to take for migraine when it occurs? I see naproxyn but not any triptans. Does she want to try imitrex.  If so then please send in

## 2018-07-10 ENCOUNTER — TELEPHONE (OUTPATIENT)
Dept: NEUROLOGY | Age: 38
End: 2018-07-10

## 2018-07-10 RX ORDER — SUMATRIPTAN 100 MG/1
100 TABLET, FILM COATED ORAL
Qty: 9 TAB | Refills: 3 | Status: SHIPPED | OUTPATIENT
Start: 2018-07-10 | End: 2018-07-10

## 2018-07-10 NOTE — TELEPHONE ENCOUNTER
Spoke with pharmacy personnel and she stated that the Imitrex prescription was pulled from the [facility] and they have it filled and ready for patient to pick-up.

## 2018-07-10 NOTE — TELEPHONE ENCOUNTER
----- Message from Royal Mcleod sent at 7/10/2018 11:18 AM EDT -----  Regarding: Dr. Soto Score  The patient is requesting that the nurse calls in the refill for Rx Imitrex into a different pharmacy due to the cost. [phone]

## 2018-07-10 NOTE — TELEPHONE ENCOUNTER
Order placed for Imitrex, 9 tabs, PO, per Verbal Order from Dr. Carol Peters on 7/10/2018 due to Migraines.

## 2018-08-27 ENCOUNTER — OFFICE VISIT (OUTPATIENT)
Dept: NEUROLOGY | Age: 38
End: 2018-08-27

## 2018-08-27 VITALS
HEIGHT: 66 IN | OXYGEN SATURATION: 98 % | WEIGHT: 129 LBS | DIASTOLIC BLOOD PRESSURE: 62 MMHG | HEART RATE: 98 BPM | BODY MASS INDEX: 20.73 KG/M2 | RESPIRATION RATE: 16 BRPM | SYSTOLIC BLOOD PRESSURE: 118 MMHG

## 2018-08-27 DIAGNOSIS — Z86.69 HISTORY OF EPILEPSY: ICD-10-CM

## 2018-08-27 DIAGNOSIS — G43.109 MIGRAINE WITH AURA AND WITHOUT STATUS MIGRAINOSUS, NOT INTRACTABLE: Primary | ICD-10-CM

## 2018-08-27 DIAGNOSIS — R56.9 SEIZURE (HCC): ICD-10-CM

## 2018-08-27 DIAGNOSIS — G43.909 MIGRAINE WITHOUT STATUS MIGRAINOSUS, NOT INTRACTABLE, UNSPECIFIED MIGRAINE TYPE: Primary | ICD-10-CM

## 2018-08-27 DIAGNOSIS — R42 VERTIGO: ICD-10-CM

## 2018-08-27 RX ORDER — RIZATRIPTAN BENZOATE 10 MG/1
10 TABLET, ORALLY DISINTEGRATING ORAL
Qty: 9 TAB | Refills: 5 | Status: SHIPPED | OUTPATIENT
Start: 2018-08-27 | End: 2018-08-27 | Stop reason: SDUPTHER

## 2018-08-27 RX ORDER — TOPIRAMATE 100 MG/1
100 TABLET, FILM COATED ORAL 2 TIMES DAILY
Qty: 60 TAB | Refills: 5 | Status: SHIPPED | OUTPATIENT
Start: 2018-08-27 | End: 2018-11-27 | Stop reason: SDUPTHER

## 2018-08-27 RX ORDER — RIZATRIPTAN BENZOATE 10 MG/1
10 TABLET, ORALLY DISINTEGRATING ORAL
Qty: 9 TAB | Refills: 5 | Status: SHIPPED | OUTPATIENT
Start: 2018-08-27 | End: 2018-08-27

## 2018-08-27 RX ORDER — SUMATRIPTAN 100 MG/1
100 TABLET, FILM COATED ORAL
COMMUNITY

## 2018-08-27 RX ORDER — TOPIRAMATE 100 MG/1
100 TABLET, FILM COATED ORAL 2 TIMES DAILY
Qty: 60 TAB | Refills: 5 | Status: SHIPPED | OUTPATIENT
Start: 2018-08-27 | End: 2018-08-27 | Stop reason: SDUPTHER

## 2018-08-27 NOTE — PATIENT INSTRUCTIONS
10 [facility]   Statement to Patients  April 1, 2014      In an effort to ensure the large volume of patient prescription refills is processed in the most efficient and expeditious manner, we are asking our patients to assist us by calling your Pharmacy for all prescription refills, this will include also your  Mail Order Pharmacy. The pharmacy will contact our office electronically to continue the refill process. Please do not wait until the last minute to call your pharmacy. We need at least 48 hours (2days) to fill prescriptions. We also encourage you to call your pharmacy before going to  your prescription to make sure it is ready. With regard to controlled substance prescription refill requests (narcotic refills) that need to be picked up at our office, we ask your cooperation by providing us with at least 72 hours (3days) notice that you will need a refill. We will not refill narcotic prescription refill requests after 4:00pm on any weekday, Monday through Thursday, or after 2:00pm on Fridays, or on the weekends. We encourage everyone to explore another way of getting your prescription refill request processed using [employer], our patient web portal through our electronic medical record system. [employer] is an efficient and effective way to communicate your medication request directly to the office and  downloadable as an frankie on your smart phone . [employer] also features a review functionality that allows you to view your medication list as well as leave messages for your physician. Are you ready to get connected? If so please review the attatched instructions or speak to any of our staff to get you set up right away! Thank you so much for your cooperation. Should you have any questions please contact our Practice Administrator.     The Physicians and Staff,  [facility]   Patient Instruction Plan/ Result Policy    If we have ordered testing for you, know that; \"NO NEWS IS GOOD NEWS! \" It is our policy that we know longer call patients with results, nor do we  give test results over the phone. We schedule follow up appointments so that your results can be discussed in person. This allows you to address any questions you have regarding the results. If something of concern is revealed on your test, we will contact you to discuss the matter and if needed schedule a sooner follow up appointment. Additionally, results may be found by using the My Chart feature and one of our patient service representatives at the  can give you instructions on how to access this feature to utilize our electronic medical record system. Thank you for your understanding.

## 2018-08-27 NOTE — LETTER
Neurology Progress Note Patient ID: Martin Dill [id number] 
43 y.o. 
1980 HISTORY PROVIDED BY: 
Patient Chief Complaint: Migraines, seizures, and dizziness Subjective:  
 Ms. Shantanu Blevins is here for follow up today of migraines, seizures, and dizziness. She had a normal EEG and MRI brain. She was started on topamax and is doing well on this. She is not having any side effects. We also gave her Imitrex to try for abortive and this is helping. She describes 2 types of headaches. One type is a migraine type headache that can be triggered by weather or other things. Is localized to the right side of her head. It does not occur during her menstrual cycle. It has improved with Topamax and is not as strong as it typically was. Her second type of headache appears with her menstrual cycle. It is severe in all over her head. No medication has made an effect on this. Imitrex only helps with the first type of headache. She also has shaking in the arms with flares and white flash in her vision only dizzy spells. She also has symptoms concerning for [employer] syndrome where she will feel like her hands lips and mouth are bigger than they really are. This occurs in the morning and at night. Only occurs 1 time. She denies any associated fatigue or loss of consciousness with it. She would like to try to capture this on EEG to ensure it is not seizure. Recall she does have a history of seizures in childhood but none recently. She was having staring spells when I first saw her but she started the Topamax this has resolved. No new focal numbness or weakness. Recap: 
Martin Dill is a 43 y.o. female who I am asked to see in consultation for migraines, seizures, dizziness. Patient reports a history of epilepsy as a child. She reports this started when she was an infant and lasted until age 2. She was treated by Dr. Horn at [facility].   She was on phenobarbital and Dilantin. At some point in her childhood she was weaned off of this but she is not sure the exact details. She knows that she did have a workup at that time but is not sure what was the cause of her seizures. She does have a son with a history of febrile seizures. No other epilepsy risk factors. As a teenager she did experience migraine headaches. They were mostly hormonally related. In 2007 during her last pregnancy she started having much more difficulty with dizzy spells and headaches. During the pregnancy she had an episode where she felt like her stomach was rising and then she threw herself to the side on a table and tried to call for help and cannot speak for up to 2 minutes. She denies having any tonic-clonic activity. The baby was checked that was okay. She has not been on any seizure medication or had any other workup at that time. Since that pregnancy she is continued to have dizzy spells, headaches, and spells. Her headaches would normally last 1-2 days but now she is having in 3-4 times per week. They are associated with nausea and vomiting and photophobia. Pain is a 10 out of 10. It can be all over her head. She will go to bed with the pain and still wake up with it. She does experience an aura weight light on the top half of one eye or spots in her vision or shooting stars. This does not occur with every migraine but does occur frequently enough that she notices it. She also has spells where she will see and hear things but her brain will shut off she cannot retain information. The last 5 months her period is changed from 5 days a month to 2 days and with that she has had more frequent headaches. She is also had an episode of a sensation of falling as well as eye twitching. She is a video today showing her left eye deviating and twitching and she reports this happened up 20 times in that particular day. It has not happened since. She has not had any recent imaging or EEGs. She was placed on naproxen daily by her PCP 2 weeks ago. She has not been on any preventative medications or antiepileptic medications. Patient does work as a . She does not drive. 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 Today she denies any focal numbness or weakness. She has had a recent eye exam that was normal. 
 
 
Objective:  
ROS: 
Per HPI- 
Otherwise 12 point ROS was negative Meds: 
Current Outpatient Prescriptions on File Prior to Visit Medication Sig Dispense Refill  topiramate (TOPAMAX) 50 mg tablet Take 2 Tabs by mouth daily. 60 Tab 5  
 naproxen (NAPROSYN) 500 mg tablet Take 1 Tab by mouth two (2) times daily (with meals). 60 Tab 2 No current facility-administered medications on file prior to visit. Imaging: MRI brain: Normal (I personally reviewed these images in PACS and this is my impression) EEG: Normal 
 
Reviewed records in [facility] and media tab today Lab Review Results for orders placed or performed in visit on 05/21/18 METABOLIC PANEL, COMPREHENSIVE Result Value Ref Range Glucose 117 (H) 65 - 99 mg/dL BUN 17 6 - 20 mg/dL Creatinine 0.77 0.57 - 1.00 mg/dL GFR est non-AA 98 >59 mL/min/1.73 GFR est  >59 mL/min/1.73  
 BUN/Creatinine ratio 22 9 - 23 Sodium 141 134 - 144 mmol/L Potassium 4.9 3.5 - 5.2 mmol/L Chloride 103 96 - 106 mmol/L  
 CO2 23 18 - 29 mmol/L Calcium 9.7 8.7 - 10.2 mg/dL Protein, total 7.0 6.0 - 8.5 g/dL Albumin 4.8 3.5 - 5.5 g/dL GLOBULIN, TOTAL 2.2 1.5 - 4.5 g/dL A-G Ratio 2.2 1.2 - 2.2 Bilirubin, total 0.3 0.0 - 1.2 mg/dL Alk. phosphatase 57 39 - 117 IU/L  
 AST (SGOT) 15 0 - 40 IU/L  
 ALT (SGPT) 11 0 - 32 IU/L  
VITAMIN B12 & FOLATE Result Value Ref Range Vitamin B12 478 232 - 1245 pg/mL Folate 7.2 >3.0 ng/mL TSH 3RD GENERATION Result Value Ref Range TSH 2.280 0.450 - 4.500 uIU/mL Exam: 
There were no vitals taken for this visit. Gen: Well developed CV: RRR Lungs: non labored breathing Abd: non distending Neuro: A&O x 3, no dysarthria or aphasia CN II-XII: PERRL, EOMI, face symmetric, tongue/palate midline Motor: strength 5/5 all four ext Sensory: intact to LT Gait: normal 
 
Assessment:  
Dhiraj Arnold is a 43 y.o. female who presents for follow up of migraines, dizzy spells, and spells. She does have a history of epilepsy as a child. EEG and MRI were negative. Will do 24 EEG to see if he can capture her episodes where she feels like her hands are larger than they really are. Migraines continue to be an issue. Will titrate up on Topamax and try a new abortive today. Plan:  
 
 
Problem List Items Addressed This Visit Migraine - Primary Relevant Medications · Increase Topamax to 100 mg twice a day. Patient will start with 50 mg in a.m. 100 mg at night for 1 week then increase to 100 mg twice a day · Continue Imitrex for abortive but will also try to get Maxalt. Other Relevant Orders · MRI BRAIN W WO CONT negative · METABOLIC PANEL, COMPREHENSIVE within normal limits · TSH 3RD GENERATION within normal limits · VITAMIN B12 & FOLATE within normal limits Other Visit Diagnoses Vertigo Other Relevant Orders · MRI BRAIN W WO CONT negative History of epilepsy Relevant Orders · MRI BRAIN W WO CONT · EEG within normal limits · Will order ambulatory EEG to attempt to capture episodes where she feels that her hands are enlarged Follow-up in 3 months Signed: 
Olam Eisenmenger, MD 
8/27/2018 Medications and side effects discussed with patient in detail. With any new medications prescribed, patient was given instructions on administration and side effects. Written medication information was provided to the patient as well. This note was created using voice recognition software.  Despite editing, there may be syntax errors. This note will not be viewable in [address] Chief Complaint Patient presents with  Seizure  Head Pain 1. Have you been to the ER, urgent care clinic since your last visit? Hospitalized since your last visit? No 
 
2. Have you seen or consulted any other health care providers outside of the [address] [place] [address] since your last visit? Include any pap smears or colon screening. No  
 
Visit Vitals  /62  Pulse 98  Resp 16  
 Ht 5' 6\" (1.676 m)  Wt 58.5 kg (129 lb)  SpO2 98%  BMI 20.82 kg/m2

## 2018-08-27 NOTE — PROGRESS NOTES
Neurology Progress Note    Patient ID:  Pranav Calixto  [id number]  27 y.o.  1980    HISTORY PROVIDED BY:  Patient      Chief Complaint: Migraines, seizures, and dizziness  Subjective:    Ms. Hanane Roger is here for follow up today of migraines, seizures, and dizziness. She had a normal EEG and MRI brain. She was started on topamax and is doing well on this. She is not having any side effects. We also gave her Imitrex to try for abortive and this is helping. She describes 2 types of headaches. One type is a migraine type headache that can be triggered by weather or other things. Is localized to the right side of her head. It does not occur during her menstrual cycle. It has improved with Topamax and is not as strong as it typically was. Her second type of headache appears with her menstrual cycle. It is severe in all over her head. No medication has made an effect on this. Imitrex only helps with the first type of headache. She also has shaking in the arms with flares and white flash in her vision only dizzy spells. She also has symptoms concerning for [employer] syndrome where she will feel like her hands lips and mouth are bigger than they really are. This occurs in the morning and at night. Only occurs 1 time. She denies any associated fatigue or loss of consciousness with it. She would like to try to capture this on EEG to ensure it is not seizure. Recall she does have a history of seizures in childhood but none recently. She was having staring spells when I first saw her but she started the Topamax this has resolved. No new focal numbness or weakness. Recap:  Pranav Calixto is a 43 y.o. female who I am asked to see in consultation for migraines, seizures, dizziness. Patient reports a history of epilepsy as a child. She reports this started when she was an infant and lasted until age 2. She was treated by Dr. Mayra Nguyen at [facility]. She was on phenobarbital and Dilantin.   At some point in her childhood she was weaned off of this but she is not sure the exact details. She knows that she did have a workup at that time but is not sure what was the cause of her seizures. She does have a son with a history of febrile seizures. No other epilepsy risk factors. As a teenager she did experience migraine headaches. They were mostly hormonally related. In 2007 during her last pregnancy she started having much more difficulty with dizzy spells and headaches. During the pregnancy she had an episode where she felt like her stomach was rising and then she threw herself to the side on a table and tried to call for help and cannot speak for up to 2 minutes. She denies having any tonic-clonic activity. The baby was checked that was okay. She has not been on any seizure medication or had any other workup at that time. Since that pregnancy she is continued to have dizzy spells, headaches, and spells. Her headaches would normally last 1-2 days but now she is having in 3-4 times per week. They are associated with nausea and vomiting and photophobia. Pain is a 10 out of 10. It can be all over her head. She will go to bed with the pain and still wake up with it. She does experience an aura weight light on the top half of one eye or spots in her vision or shooting stars. This does not occur with every migraine but does occur frequently enough that she notices it. She also has spells where she will see and hear things but her brain will shut off she cannot retain information. The last 5 months her period is changed from 5 days a month to 2 days and with that she has had more frequent headaches. She is also had an episode of a sensation of falling as well as eye twitching. She is a video today showing her left eye deviating and twitching and she reports this happened up 20 times in that particular day. It has not happened since. She has not had any recent imaging or EEGs.     She was placed on naproxen daily by her PCP 2 weeks ago. She has not been on any preventative medications or antiepileptic medications. Patient does work as a . She does not drive. 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 Today she denies any focal numbness or weakness. She has had a recent eye exam that was normal.      Objective:   ROS:  Per HPI-  Otherwise 12 point ROS was negative    Meds:  Current Outpatient Prescriptions on File Prior to Visit   Medication Sig Dispense Refill    topiramate (TOPAMAX) 50 mg tablet Take 2 Tabs by mouth daily. 60 Tab 5    naproxen (NAPROSYN) 500 mg tablet Take 1 Tab by mouth two (2) times daily (with meals). 60 Tab 2     No current facility-administered medications on file prior to visit. Imaging:  MRI brain: Normal (I personally reviewed these images in PACS and this is my impression)  EEG: Normal    Reviewed records in [facility] and media tab today    Lab Review   Results for orders placed or performed in visit on 85/72/86   METABOLIC PANEL, COMPREHENSIVE   Result Value Ref Range    Glucose 117 (H) 65 - 99 mg/dL    BUN 17 6 - 20 mg/dL    Creatinine 0.77 0.57 - 1.00 mg/dL    GFR est non-AA 98 >59 mL/min/1.73    GFR est  >59 mL/min/1.73    BUN/Creatinine ratio 22 9 - 23    Sodium 141 134 - 144 mmol/L    Potassium 4.9 3.5 - 5.2 mmol/L    Chloride 103 96 - 106 mmol/L    CO2 23 18 - 29 mmol/L    Calcium 9.7 8.7 - 10.2 mg/dL    Protein, total 7.0 6.0 - 8.5 g/dL    Albumin 4.8 3.5 - 5.5 g/dL    GLOBULIN, TOTAL 2.2 1.5 - 4.5 g/dL    A-G Ratio 2.2 1.2 - 2.2    Bilirubin, total 0.3 0.0 - 1.2 mg/dL    Alk.  phosphatase 57 39 - 117 IU/L    AST (SGOT) 15 0 - 40 IU/L    ALT (SGPT) 11 0 - 32 IU/L   VITAMIN B12 & FOLATE   Result Value Ref Range    Vitamin B12 478 232 - 1245 pg/mL    Folate 7.2 >3.0 ng/mL   TSH 3RD GENERATION   Result Value Ref Range    TSH 2.280 0.450 - 4.500 uIU/mL       Exam:  There were no vitals taken for this visit.  Gen: Well developed  CV: RRR  Lungs: non labored breathing  Abd: non distending  Neuro: A&O x 3, no dysarthria or aphasia  CN II-XII: PERRL, EOMI, face symmetric, tongue/palate midline  Motor: strength 5/5 all four ext  Sensory: intact to LT  Gait: normal    Assessment:   Freda Bell is a [address] [facility] y.o. female who presents for follow up of migraines, dizzy spells, and spells. She does have a history of epilepsy as a child. EEG and MRI were negative. Will do 24 EEG to see if he can capture her episodes where she feels like her hands are larger than they really are. Migraines continue to be an issue. Will titrate up on Topamax and try a new abortive today. Plan:       Problem List Items Addressed This Visit     Migraine - Primary    Relevant Medications    · Increase Topamax to 100 mg twice a day. Patient will start with 50 mg in a.m. 100 mg at night for 1 week then increase to 100 mg twice a day  · Continue Imitrex for abortive but will also try to get Maxalt. Other Relevant Orders    · MRI BRAIN W WO CONT negative    · METABOLIC PANEL, COMPREHENSIVE within normal limits    · TSH 3RD GENERATION within normal limits    · VITAMIN B12 & FOLATE within normal limits      Other Visit Diagnoses     Vertigo        Other Relevant Orders    · MRI BRAIN W WO CONT negative    History of epilepsy        Relevant Orders    · MRI BRAIN W WO CONT    · EEG within normal limits  · Will order ambulatory EEG to attempt to capture episodes where she feels that her hands are enlarged     Follow-up in 3 months    Signed:  Adrian Caballero MD  8/27/2018    Medications and side effects discussed with patient in detail. With any new medications prescribed, patient was given instructions on administration and side effects. Written medication information was provided to the patient as well. This note was created using voice recognition software. Despite editing, there may be syntax errors.    This note will not be viewable in [address]

## 2018-08-27 NOTE — MR AVS SNAPSHOT
[address] [facility] 
[phone] Patient: Perlita Gómez MRN: [id number] :1980 Visit Information Date & Time Provider Department Dept. Phone Encounter #  
 2018  2:40 PM Twyla Hauser  [facility] [phone] [id number] Upcoming Health Maintenance Date Due Pneumococcal 19-64 Medium Risk (1 of 1 - PPSV23) 1998 DTaP/Tdap/Td series (1 - Tdap) 2000 PAP AKA CERVICAL CYTOLOGY 2000 Influenza Age 5 to Adult 2018 Allergies as of 2018  Review Complete On: 2018 By: Twyla Hauser MD  
 No Known Allergies Current Immunizations  Reviewed on 2016 No immunizations on file. Not reviewed this visit You Were Diagnosed With   
  
 Codes Comments Migraine with aura and without status migrainosus, not intractable    -  Primary ICD-10-CM: G43.109 ICD-9-CM: 346.00 Seizure ([facility] [address]     ICD-10-CM: R56.9 ICD-9-CM: 780.39 History of epilepsy     ICD-10-CM: Z86.69 
ICD-9-CM: V12.49 Vertigo     ICD-10-CM: E23 ICD-9-CM: 780.4 Vitals BP Pulse Resp Height(growth percentile) Weight(growth percentile) SpO2  
 118/62 98 16 5' 6\" (1.676 m) 129 lb (58.5 kg) 98% BMI OB Status Smoking Status 20.82 kg/m2 Having regular periods Light Tobacco Smoker BMI and BSA Data Body Mass Index Body Surface Area  
 20.82 kg/m 2 1.65 m 2 Preferred Pharmacy Pharmacy Name Phone [place] [id number] [facility] [place] [facility] [address] [place] [phone] Your Updated Medication List  
  
   
This list is accurate as of 18  3:18 PM.  Always use your most recent med list.  
  
  
  
  
 IMITREX 100 mg tablet Generic drug:  SUMAtriptan Take 100 mg by mouth once as needed for Migraine. naproxen 500 mg tablet Commonly known as:  NAPROSYN  
 Take 1 Tab by mouth two (2) times daily (with meals). rizatriptan 10 mg disintegrating tablet Commonly known as:  MAXALT-MLT Take 1 Tab by mouth once as needed for Migraine for up to 1 dose. topiramate 100 mg tablet Commonly known as:  TOPAMAX Take 1 Tab by mouth two (2) times a day. Prescriptions Printed Refills  
 topiramate (TOPAMAX) 100 mg tablet (Discontinued) 5 Sig: Take 1 Tab by mouth two (2) times a day. Class: Print Route: Oral  
 Reason for Discontinue: Reorder  
 rizatriptan (MAXALT-MLT) 10 mg disintegrating tablet (Discontinued) 5 Sig: Take 1 Tab by mouth once as needed for Migraine for up to 1 dose. Class: Print Route: Oral  
 Reason for Discontinue: Reorder To-Do List   
 08/28/2018 Neurology:  EEG 24 HR HOLTER AMB NEURO Patient Instructions PRESCRIPTION REFILL POLICY [facility] Statement to Patients April 1, 2014 In an effort to ensure the large volume of patient prescription refills is processed in the most efficient and expeditious manner, we are asking our patients to assist us by calling your Pharmacy for all prescription refills, this will include also your  Mail Order Pharmacy. The pharmacy will contact our office electronically to continue the refill process. Please do not wait until the last minute to call your pharmacy. We need at least 48 hours (2days) to fill prescriptions. We also encourage you to call your pharmacy before going to  your prescription to make sure it is ready. With regard to controlled substance prescription refill requests (narcotic refills) that need to be picked up at our office, we ask your cooperation by providing us with at least 72 hours (3days) notice that you will need a refill. We will not refill narcotic prescription refill requests after 4:00pm on any weekday, Monday through Thursday, or after 2:00pm on Fridays, or on the weekends. We encourage everyone to explore another way of getting your prescription refill request processed using [employer], our patient web portal through our electronic medical record system. [employer] is an efficient and effective way to communicate your medication request directly to the office and  downloadable as an frankie on your smart phone . [employer] also features a review functionality that allows you to view your medication list as well as leave messages for your physician. Are you ready to get connected? If so please review the attatched instructions or speak to any of our staff to get you set up right away! Thank you so much for your cooperation. Should you have any questions please contact our Practice Administrator. The Physicians and Staff,  [facility] Patient Instruction Plan/ Result Policy If we have ordered testing for you, know that; \"NO NEWS IS GOOD NEWS! \" It is our policy that we know longer call patients with results, nor do we  give test results over the phone. We schedule follow up appointments so that your results can be discussed in person. This allows you to address any questions you have regarding the results. If something of concern is revealed on your test, we will contact you to discuss the matter and if needed schedule a sooner follow up appointment. Additionally, results may be found by using the My Chart feature and one of our patient service representatives at the  can give you instructions on how to access this feature to utilize our electronic medical record system. Thank you for your understanding. Introducing [facility]! Dear Donna Young: Thank you for requesting a [employer] account. Our records indicate that you already have an active [employer] account. You can access your account anytime at [email or web address] Did you know that you can access your hospital and ER discharge instructions at any time in [employer]? You can also review all of your test results from your hospital stay or ER visit. Additional Information If you have questions, please visit the Frequently Asked Questions section of the [employer] website at [email or web address] Remember, [employer] is NOT to be used for urgent needs. For medical emergencies, dial 911. Now available from your iPhone and Android! Please provide this summary of care documentation to your next provider. Your primary care clinician is listed as WARD LOPEZ. If you have any questions after today's visit, please call [phone].

## 2018-08-27 NOTE — PROGRESS NOTES
Chief Complaint   Patient presents with    Seizure    Head Pain     1. Have you been to the ER, urgent care clinic since your last visit? Hospitalized since your last visit? No    2. Have you seen or consulted any other health care providers outside of the [address] [place] [address] since your last visit? Include any pap smears or colon screening.  No     Visit Vitals    /62    Pulse 98    Resp 16    Ht 5' 6\" (1.676 m)    Wt 58.5 kg (129 lb)    SpO2 98%    BMI 20.82 kg/m2

## 2018-08-27 NOTE — TELEPHONE ENCOUNTER
Called and spoke with phamacy staff and asked them to discontinue the Topamax that was sent to the pharmacy as patient has paper script for medication. They stated that they would discontinue it.

## 2018-08-27 NOTE — TELEPHONE ENCOUNTER
Topamax and Maxalt    Order placed for Topamax and Maxalt,PO, per Verbal Order from Dr. Ashley Jaime on 8/27/2018 due to migraine. I had to print out the prescriptions as Dr. Ashley Jaime was unable to due to printer issues.

## 2018-09-05 ENCOUNTER — HOSPITAL ENCOUNTER (OUTPATIENT)
Dept: NEUROLOGY | Age: 38
Discharge: HOME | End: 2018-09-05
Attending: PSYCHIATRY & NEUROLOGY
Payer: SELF-PAY

## 2018-09-05 DIAGNOSIS — R56.9 SEIZURE (HCC): ICD-10-CM

## 2018-09-05 DIAGNOSIS — Z86.69 HISTORY OF EPILEPSY: ICD-10-CM

## 2018-09-05 PROCEDURE — 95953 NEURO EEG 24 HR: CPT

## 2018-09-21 NOTE — PROCEDURES
Patient Name: Kristine Sánchez  : 1980  Age: 43 y.o. Ordering physician: Octavio Driver  Date of EE18  EEG procedure number: [id number]  Diagnosis: spells  Interpreting physician: Jarett Fox MD      ELECTROENCEPHALOGRAM REPORT     PROCEDURE: 24 HOUR AMBULATORY EEG    START TIME: 18 @ 1059  END TIME: 18 @ 7425    CLINICAL INDICATION: The patient is a 43 y.o. female with a history of   possible seizures. EEG to rule out seizures, rule out stroke, rule out   cortical abnormality. EEG CLASSIFICATION: Essentially normal awake and asleep. DESCRIPTION OF THE RECORD:   The background of this recording contains a posteriorly-located occipital alpha rhythm of 11 Hz that attenuates with eye opening. Throughout the recording, there were no clear areas of focal slowing nor spike or spike-and-wave discharges seen. Hyperventilation was not performed. Photic stimulation was not performed. During the recording, the patient did enter prolonged states of sleep with K-complexes and sleep spindles seen in the central head regions. DIARY EVENTS: none    INTERPRETATION: This is a normal electroencephalogram with the patient   awake and asleep, showing no clear focal abnormalities or epileptiform   activity. A normal EEG doesn't not rule out seizures. Clinical correlation recommended.     Octavio Driver MD  2018  5:13 PM

## 2018-11-28 RX ORDER — TOPIRAMATE 100 MG/1
100 TABLET, FILM COATED ORAL 2 TIMES DAILY
Qty: 60 TAB | Refills: 5 | Status: SHIPPED | OUTPATIENT
Start: 2018-11-28 | End: 2018-12-03 | Stop reason: SDUPTHER

## 2018-12-03 RX ORDER — TOPIRAMATE 100 MG/1
100 TABLET, FILM COATED ORAL 2 TIMES DAILY
Qty: 60 TAB | Refills: 5 | Status: SHIPPED | OUTPATIENT
Start: 2018-12-03

## 2018-12-03 RX ORDER — TOPIRAMATE 100 MG/1
100 TABLET, FILM COATED ORAL 2 TIMES DAILY
Qty: 60 TAB | Refills: 5 | Status: CANCELLED | OUTPATIENT
Start: 2018-12-03

## 2018-12-03 RX ORDER — RIZATRIPTAN BENZOATE 10 MG/1
10 TABLET ORAL
Qty: 9 TAB | Refills: 5 | Status: SHIPPED | OUTPATIENT
Start: 2018-12-03 | End: 2018-12-03

## 2018-12-05 RX ORDER — TOPIRAMATE 50 MG/1
50 TABLET, FILM COATED ORAL 2 TIMES DAILY
Qty: 60 TAB | Refills: 5 | Status: SHIPPED | OUTPATIENT
Start: 2018-12-05

## 2018-12-05 NOTE — TELEPHONE ENCOUNTER
Order placed for Topamax 50 mg tablet BID, PO, per Verbal Order from Dr. Rickey Ulloa on 12/5/2018 due to seizure.

## 2018-12-11 ENCOUNTER — TELEPHONE (OUTPATIENT)
Dept: NEUROLOGY | Age: 38
End: 2018-12-11

## 2018-12-11 NOTE — TELEPHONE ENCOUNTER
----- Message from Libertad Peraza sent at 12/11/2018  1:46 PM EST -----  Regarding: Dr. Kath Littlejohn  Pt stated she would like a call from the doctor regarding an increase of her Topamax dosage, but her weight has gone down to 117 lbs, and is concerned about the increase, and would like to know if she should wait until she can get her weight up. Best contact number [phone].

## 2018-12-11 NOTE — TELEPHONE ENCOUNTER
Talked with patient and she is worried about an increase in the Topamax while she is at a lower weight 117lbs, but is trying to gain and wondered what you felt about an increase in the Topamax she is at 100 mg BID

## 2018-12-13 ENCOUNTER — TELEPHONE (OUTPATIENT)
Dept: NEUROLOGY | Age: 38
End: 2018-12-13

## 2018-12-13 NOTE — TELEPHONE ENCOUNTER
----- Message from Alfredo Preston sent at 12/13/2018 12:03 PM EST -----  Regarding: Dr Ferrera/telephone  Pt (p) [phone],JESSICA was returning the nurses call.

## 2018-12-17 NOTE — TELEPHONE ENCOUNTER
----- Message from Binta Dhaliwal sent at 12/17/2018  1:35 PM EST -----  Regarding: /Telephone  Pt called requesting a call back in regards to weight gain powder. Pt stated she only gain 2 ounces along with eating food. Pt best contact number is [phone] .

## 2019-01-14 ENCOUNTER — TELEPHONE (OUTPATIENT)
Dept: NEUROLOGY | Age: 39
End: 2019-01-14

## 2019-01-18 ENCOUNTER — TELEPHONE (OUTPATIENT)
Dept: NEUROLOGY | Age: 39
End: 2019-01-18

## 2019-01-18 NOTE — TELEPHONE ENCOUNTER
Regarding: FW: Visit Follow-Up Question  Contact: [phone]  Can you please call the patient and let her know that we are treating her for both migraine and supposed in epilepsy. I think in terms of her questions and treatment we just need to have an office visit. If I have anything sooner than her scheduled appointment please move it up, otherwise please let her know we will discuss everything at the office visit.  ----- Message -----  From: Desire Restrepo RN  Sent: 1/15/2019  12:50 PM  To: Nicole Ferguson MD  Subject: FW: Visit Follow-Up Question                         ----- Message -----  From: Ramiro Adhikari LPN  Sent: 8/14/6158   1:33 PM  To: Mariam Soria RN  Subject: FW: Visit Follow-Up Question                         ----- Message -----  From: Jerry Andrew  Sent: 1/14/2019   1:26 PM  To: [facility]  Subject: RE: Visit Follow-Up Question                     ----- Message from [address] [place] [address], [facility] sent at 1/14/2019  1:26 PM EST -----    Still no change in weight. Feel stuck where I'm at. Can't go up in medicine or up in weight. It's getting frustrating. I'm not looking to gain 40 lbs here but to level it enough but be a win for me :) top half I'm nothing but bones and ribs bottom half I'm muscle. I'm not sure where to go from here. I'm eating good as I'm anemic and need the iron. The only thing left to control is the leg jerks. I'd also like to know what I'm diagnosed with. As I have a feeling this is more than just migraine with aura? Does she feel this is epilepsy I have?  ----- Message -----  From: Mariam Soria RN  Sent: 1/14/2019 10:24 AM EST  To: Jerry Andrew  Subject: RE: Visit Follow-Up Question  Hello,    I am reaching out to you as I am wondering about your weight and the medication Topamax.      Thank you,  Ysabel Vieyra    ----- Message -----     From: Jerry Andrew     Sent: 12/15/2018 11:31 AM EST       To: Nicole Ferguson MD  Subject: RE: Visit Follow-Up Question    I got your message Thursday and called back but never got a call so figured I'd message you to see what mrs. Ferrera has decided on regarding my weight.  ----- Message -----  From: Vicente Keating RN  Sent: 12/5/2018  8:44 AM EST  To: Judythe Landau  Subject: RE: Visit Ignacio Celis,    Dr. Angelic Albarado wanted to let you know that the jerks could be seizure activity and she suggests increasing your Topamax to 150 mg two times daily. I will send in Topamax 50 mg into your pharmacy. We already sent in the Topamax 100 mg tablets. You would take one Topamax 100 mg tablet and one of the Topamax 50 mg tablet two times daily. Thank you,  Ori Baum    ----- Message -----     From: Judythe Landau     Sent: 12/4/2018  4:01 PM EST       To: Evon Powell MD  Subject: RE: Visit Follow-Up Question    I do have a medical question though. I haven't  had many issues except when I have had right sleepless nights. But one thing I'm still having trouble with is the kicking out which doesn't bother me but the other night I had my daughter at my feet on her computer when one of these episodes happened. I didn't kick her thank god but needless to say it scared the Jb Saunders out of her. Considering it didn't show on the eeg when I had them during that test I'd really like to know how to control these jerks. This was probably the most intense one I've had so far.   ----- Message -----  From: Vicente Keating RN  Sent: 12/3/2018 12:34 PM EST  To: Judythe Landau  Subject: RE: Visit Follow-Up Question  [id number] Nie[address] Helton thanks I will pend these the Dr. Angelic Albarado after she reviews them they should be sent into your pharmacy.     Thank you    ----- Message -----     From: Judythe Landau     Sent: 12/3/2018 12:22 PM EST       To: Evon Powell MD  Subject: RE: Visit Follow-Up Question    It was 10ng sorry   ----- Message -----  From: Vicente Keating RN  Sent: 12/3/2018 12:11 PM EST  To: Judythe Landau  Subject: RE: Visit Follow-Up Question  At this time we do not have any available appt. until February. So I would call our office and schedule the next available with Dr. Greg Mcdonald. Our office number is [phone]. Thank you,  Germania Gonzalez    ----- Message -----     From: Jasmina Mchugh     Sent: 12/3/2018 12:09 PM EST       To: Michelle Mckay MD  Subject: RE: Visit Follow-Up Question    Should I just hold off my appt til the medicaid goes through then since I haven't heard from financial aide?  ----- Message -----  From: Louisa Latham RN  Sent: 12/3/2018 12:04 PM EST  To: Jasmina Mchugh  Subject: RE: Visit Follow-Up Question  Hello,    We do take medicaid and if you let me know the prescription that you need to have filled and can send them into your pharmacy. Thank you,  Germania Gonzalez    ----- Message -----     From: Jasmina Mchugh     Sent: 11/23/2018  1:37 PM EST       To: Michelle Mckay MD  Subject: Visit Follow-Up Question    I start medicaid on Jan 1st. Not sure if your office takes medicaid but I want to continue seeing you. I know I'm coming up due for a follow up but haven't heard anything about my paperwork submitted in for financial aid yet . How can I best do this with seeing you in the mean time for my refill? Also I have lost my latest migraine prescription. I didn't have the funds at the time to fill them. I'm not sure where I put the paper down at.  Not sure if it can be called in but I do have rx gold to get them cheaper

## 2019-01-19 ENCOUNTER — HOSPITAL ENCOUNTER (EMERGENCY)
Age: 39
Discharge: HOME | End: 2019-01-19
Attending: EMERGENCY MEDICINE
Payer: COMMERCIAL

## 2019-01-19 VITALS
RESPIRATION RATE: 12 BRPM | TEMPERATURE: 97.8 F | SYSTOLIC BLOOD PRESSURE: 114 MMHG | DIASTOLIC BLOOD PRESSURE: 55 MMHG | HEART RATE: 73 BPM | HEIGHT: 66 IN | OXYGEN SATURATION: 100 % | WEIGHT: 120 LBS | BODY MASS INDEX: 19.29 KG/M2

## 2019-01-19 DIAGNOSIS — K02.9 DENTAL CARIES INTO PULP: ICD-10-CM

## 2019-01-19 DIAGNOSIS — K08.89 ODONTALGIA: Primary | ICD-10-CM

## 2019-01-19 DIAGNOSIS — F17.200 NICOTINE DEPENDENCE, UNCOMPLICATED, UNSPECIFIED NICOTINE PRODUCT TYPE: ICD-10-CM

## 2019-01-19 PROCEDURE — 99283 EMERGENCY DEPT VISIT LOW MDM: CPT

## 2019-01-19 PROCEDURE — 74011000250 HC RX REV CODE- 250: Performed by: PHYSICIAN ASSISTANT

## 2019-01-19 PROCEDURE — 74011250637 HC RX REV CODE- 250/637: Performed by: PHYSICIAN ASSISTANT

## 2019-01-19 RX ORDER — PENICILLIN V POTASSIUM 500 MG/1
500 TABLET, FILM COATED ORAL 3 TIMES DAILY
Qty: 30 TAB | Refills: 0 | Status: SHIPPED | OUTPATIENT
Start: 2019-01-19 | End: 2019-01-29

## 2019-01-19 RX ORDER — PENICILLIN V POTASSIUM 250 MG/1
500 TABLET, FILM COATED ORAL
Status: COMPLETED | OUTPATIENT
Start: 2019-01-19 | End: 2019-01-19

## 2019-01-19 RX ADMIN — PENICILLIN V POTASIUM 500 MG: 250 TABLET ORAL at 11:01

## 2019-01-19 RX ADMIN — LIDOCAINE HYDROCHLORIDE: 20 SOLUTION ORAL; TOPICAL at 11:01

## 2019-01-19 NOTE — ED TRIAGE NOTES
Left lower tooth pain x 3 days \"if I can just get through until Monday then I can call the dentist\"  Taking ibuprofen 600 mg PO, last dose 3 hours ago.

## 2019-01-19 NOTE — ED NOTES
Patient given dental balls instructions, verbalizes understanding.  
 
Discharged by DAYAN Holcomb.

## 2019-01-19 NOTE — ED PROVIDER NOTES
Mahesh Burrows is a 35 y.o. female who presents ambularory to the ED with a c/o left sided dental pain x 3 days. Pt notes she has had extensive problems with her teeth. She has taken otc meds without relief for her lower dentition. She states she is planning to call the dentist Monday 1/21/19. Pt notes her last dose of motrin was 3 hours pta. She specifically denies any fevers, chills, nausea, vomiting, chest pain, abd pain, urinary sx, shortness of breath, headache, rash, diarrhea, sweating or weight loss. Pt denies currently having a dentist, but she now has her part medicaid go through and plans on having all of her teeth pulled. Pt denies facial swelling PCP: Tramaine Collins MD 
PMHx significant for: Past Medical History: 
No date: Hx of headache No date: Seizures ([facility] [address] PSHx significant for: Past Surgical History: 
No date: HX APPENDECTOMY No date: HX GYN Comment:  tubal ligation Social Hx: Tobacco: .1/2 ppd  EtOH: social Illicit drug use: denies There are no further complaints or symptoms at this time. The history is provided by the patient. Past Medical History:  
Diagnosis Date  Hx of headache  Seizures ([address] Past Surgical History:  
Procedure Laterality Date  HX APPENDECTOMY  HX GYN    
 tubal ligation No family history on file. Social History Socioeconomic History  Marital status:  Spouse name: Not on file  Number of children: Not on file  Years of education: Not on file  Highest education level: Not on file Social Needs  Financial resource strain: Not on file  Food insecurity - worry: Not on file  Food insecurity - inability: Not on file  Transportation needs - medical: Not on file  Transportation needs - non-medical: Not on file Occupational History  Not on file Tobacco Use  Smoking status: Light Tobacco Smoker  Smokeless tobacco: Never Used Substance and Sexual Activity  Alcohol use: No  
 Drug use: Not on file  Sexual activity: Not on file Other Topics Concern  Not on file Social History Narrative  Not on file ALLERGIES: Patient has no known allergies. Review of Systems Constitutional: Negative for chills and fever. HENT: Positive for dental problem. Negative for congestion, rhinorrhea, sneezing and sore throat. Eyes: Negative for redness and visual disturbance. Respiratory: Negative for shortness of breath. Cardiovascular: Negative for chest pain and leg swelling. Gastrointestinal: Negative for abdominal pain, nausea and vomiting. Genitourinary: Negative for difficulty urinating and frequency. Musculoskeletal: Negative for back pain, myalgias and neck stiffness. Skin: Negative for rash. Neurological: Negative for dizziness, syncope, weakness and headaches. Hematological: Negative for adenopathy. Vitals:  
 01/19/19 1022 BP: 114/55 Pulse: 73 Resp: 12 Temp: 97.8 °F (36.6 °C) SpO2: 100% Weight: 54.4 kg (120 lb) Height: 5' 6\" (1.676 m) Physical Exam  
Constitutional: She is oriented to person, place, and time. She appears well-developed and well-nourished. No distress. HENT:  
Head: Normocephalic and atraumatic. Right Ear: External ear normal.  
Left Ear: External ear normal.  
Grossly carious throughout with multiple teeth in various stages of disrepair. # 19 eroded to pulp without swelling + chronic darkening/ discoloration. No erythema, fluctuance or drainage Eyes: EOM are normal. Pupils are equal, round, and reactive to light. Neck: Neck supple. Cardiovascular: Normal rate, regular rhythm, normal heart sounds and intact distal pulses. Exam reveals no gallop and no friction rub. No murmur heard. Pulmonary/Chest: Effort normal and breath sounds normal. No stridor. No respiratory distress. She has no wheezes. She has no rales. She exhibits no tenderness. Musculoskeletal: Normal range of motion. She exhibits no edema, tenderness or deformity. Neurological: She is alert and oriented to person, place, and time. No cranial nerve deficit. Coordination normal.  
Skin: Skin is warm and dry. Capillary refill takes less than 2 seconds. No rash noted. No erythema. No pallor. Psychiatric: She has a normal mood and affect. Her behavior is normal.  
Nursing note and vitals reviewed. MDM Number of Diagnoses or Management Options Dental caries into pulp:  
Nicotine dependence, uncomplicated, unspecified nicotine product type:  
Odontalgia:  
  
Amount and/or Complexity of Data Reviewed Review and summarize past medical records: yes Patient Progress Patient progress: stable Procedures 10:47 AM 
Discussed with the patient the medical risks of prolonged smoking habits and advised the patient of the benefits of the cessation of smoking. The patient verbalized their understanding. DAYAN Fu 
 
 
 
10:47 AM 
Discussed pt, sx, hx and current findings with Kennedi Santana MD. He is in agreement with plan. Will give pen v k rx and dental balls for pian. Pt given dental clinic list 
Robbie Rob. SU[employer] [facility] 
 
LABORATORY TESTS: 
No results found for this or any previous visit (from the past 12 hour(s)). IMAGING RESULTS: 
 
No results found. MEDICATIONS GIVEN: 
Medications  
dental ball (lidocaine/benadryl/benzocaine) mixture ( Mucous Membrane Given 1/19/19 1101) penicillin v potassium (VEETID) tablet 500 mg (500 mg Oral Given 1/19/19 1101) IMPRESSION: 
1. Odontalgia 2. Dental caries into pulp 3. Nicotine dependence, uncomplicated, unspecified nicotine product type PLAN: 
1. Discharge Medication List as of 1/19/2019 10:50 AM  
  
START taking these medications Details  
penicillin v potassium (VEETID) 500 mg tablet Take 1 Tab by mouth three (3) times daily for 10 days. , Print, Disp-30 Tab, R-0  
  
  
 CONTINUE these medications which have NOT CHANGED Details  
!! topiramate (TOPAMAX) 50 mg tablet Take 1 Tab by mouth two (2) times a day., Normal, Disp-60 Tab, R-5  
  
!! topiramate (TOPAMAX) 100 mg tablet Take 1 Tab by mouth two (2) times a day., Normal, Disp-60 Tab, R-5  
  
SUMAtriptan (IMITREX) 100 mg tablet Take 100 mg by mouth once as needed for Migraine., Historical Med  
  
naproxen (NAPROSYN) 500 mg tablet Take 1 Tab by mouth two (2) times daily (with meals). , Normal, Disp-60 Tab, R-2  
  
 !! - Potential duplicate medications found. Please discuss with provider. 2.  
Follow-up Information Follow up With Specialties Details Why Contact Info  
 dental clinic per list  Schedule an appointment as soon as possible for a visit 2-4 days for recheck Return to ED if worse 10:47 AM 
Pt has been reexamined. Pt has no new complaints, changes or physical findings. Care plan outlined and precautions discussed. All available results were reviewed with pt. All medications were reviewed with pt. All of pt's questions and concerns were addressed. Pt agrees to F/U as instructed and agrees to return to ED upon further deterioration. Pt is ready to go home.  
DAYAN Marquez

## 2019-01-19 NOTE — DISCHARGE INSTRUCTIONS
Brush your teeth 2-3 times daily. Floss daily. NO SMOKING     Learning About Benefits From Quitting Smoking  How does quitting smoking make you healthier? If you're thinking about quitting smoking, you may have a few reasons to be smoke-free. Your health may be one of them. · When you quit smoking, you lower your risks for cancer, lung disease, heart attack, stroke, blood vessel disease, and blindness from macular degeneration. · When you're smoke-free, you get sick less often, and you heal faster. You are less likely to get colds, flu, bronchitis, and pneumonia. · As a nonsmoker, you may find that your mood is better and you are less stressed. When and how will you feel healthier? Quitting has real health benefits that start from day 1 of being smoke-free. And the longer you stay smoke-free, the healthier you get and the better you feel. The first hours  · After just 20 minutes, your blood pressure and heart rate go down. That means there's less stress on your heart and blood vessels. · Within 12 hours, the level of carbon monoxide in your blood drops back to normal. That makes room for more oxygen. With more oxygen in your body, you may notice that you have more energy than when you smoked. After 2 weeks  · Your lungs start to work better. · Your risk of heart attack starts to drop. After 1 month  · When your lungs are clear, you cough less and breathe deeper, so it's easier to be active. · Your sense of taste and smell return. That means you can enjoy food more than you have since you started smoking. Over the years  · After 1 year, your risk of heart disease is half what it would be if you kept smoking. · After 5 years, your risk of stroke starts to shrink. Within a few years after that, it's about the same as if you'd never smoked. · After 10 years, your risk of dying from lung cancer is cut by about half. And your risk for many other types of cancer is lower too.   How would quitting help others in your life? When you quit smoking, you improve the health of everyone who now breathes in your smoke. · Their heart, lung, and cancer risks drop, much like yours. · They are sick less. For babies and small children, living smoke-free means they're less likely to have ear infections, pneumonia, and bronchitis. · If you're a woman who is or will be pregnant someday, quitting smoking means a healthier . · Children who are close to you are less likely to become adult smokers. Where can you learn more? Go to http://jessica-handy.info/. Enter 052 [id number] 72 11 in the search box to learn more about \"Learning About Benefits From Quitting Smoking. \"  Current as of: 2018  Content Version: 11.9  © 7569-6079 [employer]. Care instructions adapted under license by [employer] (which disclaims liability or warranty for this information). If you have questions about a medical condition or this instruction, always ask your healthcare professional. [place] [address] any warranty or liability for your use of this information. Patient Education        Tooth and Gum Pain: Care Instructions  Your Care Instructions    The most common causes of dental pain are tooth decay and gum disease. Pain can also be caused by an infection of the tooth (abscess) or the gums. Or you may have pain from a broken or cracked tooth. Other causes of pain include infection and damage to a tooth from nervous grinding of your teeth. A wisdom tooth can be painful when it is coming in but cannot break through the gum. It can also be painful when the tooth is only partway in and extra gum tissue has formed around it. The tissue can get inflamed (pericoronitis), and sometimes it gets infected. Prompt dental care can help find the cause of your toothache and keep the tooth from dying or gum disease from getting worse.  Self-care at home may reduce your pain and discomfort. Follow-up care is a key part of your treatment and safety. Be sure to make and go to all appointments, and call your dentist or doctor if you are having problems. It's also a good idea to know your test results and keep a list of the medicines you take. How can you care for yourself at home? · To reduce pain and facial swelling, put an ice or cold pack on the outside of your cheek for 10 to 20 minutes at a time. Put a thin cloth between the ice and your skin. Do not use heat. · If your doctor prescribed antibiotics, take them as directed. Do not stop taking them just because you feel better. You need to take the full course of antibiotics. · Ask your doctor if you can take an over-the-counter pain medicine, such as acetaminophen (Tylenol), ibuprofen (Advil, Motrin), or naproxen (Aleve). Be safe with medicines. Read and follow all instructions on the label. · Avoid very hot, cold, or sweet foods and drinks if they increase your pain. · Rinse your mouth with warm salt water every 2 hours to help relieve pain and swelling. Mix 1 teaspoon of salt in 8 ounces of water. · Talk to your dentist about using special toothpaste for sensitive teeth. To reduce pain on contact with heat or cold or when brushing, brush with this toothpaste regularly or rub a small amount of the paste on the sensitive area with a clean finger 2 or 3 times a day. Floss gently between your teeth. · Do not smoke or use spit tobacco. Tobacco use can make gum problems worse, decreases your ability to fight infection in your gums, and delays healing. If you need help quitting, talk to your doctor about stop-smoking programs and medicines. These can increase your chances of quitting for good. When should you call for help? Call 911 anytime you think you may need emergency care.  For example, call if:    · You have trouble breathing.    Call your dentist or doctor now or seek immediate medical care if:    · You have signs of infection, such as:  ? Increased pain, swelling, warmth, or redness. ? Red streaks leading from the area. ? Pus draining from the area. ? A fever.    Watch closely for changes in your health, and be sure to contact your doctor if:    · You do not get better as expected. Where can you learn more? Go to http://jessica-handy.info/. Enter 0363 [id number] in the search box to learn more about \"Tooth and Gum Pain: Care Instructions. \"  Current as of: March 27, 2018  Content Version: 11.9  © 1956-6763 [employer]. Care instructions adapted under license by [employer] (which disclaims liability or warranty for this information). If you have questions about a medical condition or this instruction, always ask your healthcare professional. [address] any warranty or liability for your use of this information. Emergency [address] by [facility]  [address] [facility], [address]  Open M, W, F: 8AM - 5PM and T, Th: 8AM-6PM  Phone: [phone], press 4  $70 for Emergency Care  $60 for first routine care, then pay by sliding scale based upon income.     [facility] [address] [facility]  Phone: [phone]    [facility]  [address] [facility], [address]  Open Monday - Friday 8AM - 4:30 PM  Phone: [address] [place] [address] Dentistry Urgent [address] [facility], [address] [place] [address], [address] [place] [address] starting at 8:30 AM M-F  Phone: [phone], press 2  Fee: $150 per tooth (x-ray & extractions only)  Pediatrics Phone[de-identified] [phone], 8-5 M-F    [address] Dentistry, [address] [place] [address], 2nd Floor, [address] [place] [address] starting at 8:30 AM - 3 PM M - F    [facility]  [address] [facility], [address] [place] [address]  Phone: [phone] or [phone]  Emergency Hours: 9:30AM - 11AM (extractions)  Simple tooth extraction $ per tooth. #75 for x-ray    [facility] Residents only, over the age of 25  Phone: [phone]. Leave message saying you need an appointment to register.   Hours: Tuesday Evenings

## 2020-12-07 ENCOUNTER — VIRTUAL VISIT (OUTPATIENT)
Dept: FAMILY MEDICINE CLINIC | Age: 40
End: 2020-12-07
Payer: COMMERCIAL

## 2020-12-07 DIAGNOSIS — J30.89 ENVIRONMENTAL AND SEASONAL ALLERGIES: ICD-10-CM

## 2020-12-07 DIAGNOSIS — J41.8 MIXED SIMPLE AND MUCOPURULENT CHRONIC BRONCHITIS (HCC): Primary | ICD-10-CM

## 2020-12-07 PROCEDURE — 99213 OFFICE O/P EST LOW 20 MIN: CPT | Performed by: NURSE PRACTITIONER

## 2020-12-07 RX ORDER — AMOXICILLIN 875 MG/1
TABLET, FILM COATED ORAL
COMMUNITY
Start: 2020-12-02 | End: 2021-04-19 | Stop reason: ALTCHOICE

## 2020-12-07 RX ORDER — CETIRIZINE HCL 10 MG
10 TABLET ORAL DAILY
Qty: 30 TAB | Refills: 5 | Status: SHIPPED | OUTPATIENT
Start: 2020-12-07

## 2020-12-07 RX ORDER — METHYLPREDNISOLONE 4 MG/1
TABLET ORAL
COMMUNITY
Start: 2020-12-02 | End: 2021-04-19 | Stop reason: ALTCHOICE

## 2020-12-07 RX ORDER — BUDESONIDE AND FORMOTEROL FUMARATE DIHYDRATE 160; 4.5 UG/1; UG/1
2 AEROSOL RESPIRATORY (INHALATION) 2 TIMES DAILY
Qty: 1 INHALER | Refills: 3 | Status: SHIPPED | OUTPATIENT
Start: 2020-12-07

## 2020-12-07 RX ORDER — ALBUTEROL SULFATE 90 UG/1
AEROSOL, METERED RESPIRATORY (INHALATION)
COMMUNITY
Start: 2020-12-02

## 2020-12-07 RX ORDER — GABAPENTIN 300 MG/1
300 CAPSULE ORAL 3 TIMES DAILY
COMMUNITY

## 2020-12-07 NOTE — PROGRESS NOTES
Consent: Virgilio Araiza, who was seen by synchronous (real-time) audio-video technology, and/or her healthcare decision maker, is aware that this patient-initiated, Telehealth encounter on 12/7/2020 is a billable service, with coverage as determined by her insurance carrier. She is aware that she may receive a bill and has provided verbal consent to proceed: Yes. 712      Subjective:   Virgilio Araiza is a 38 y.o. female who was seen for Allergies    Pt here to discuss allergy symptoms and persistent cough   Reports she has been to several urgent cares throughout the year, last visit was approx 1 week ago  States symptoms occur repetitively, seems to have flare ups extremely often  Has been to urgent care approx 5 times since COVID started, has been tested for COVID an reports negative result   When she went last week she was told she has asthma d/t wheezing and was prescribed prednisone and albuterol inhaler as well as amoxicillin  States when she was younger she had recurrent bronchitis but was never diagnosed w/ asthma  Is a current smoker, approx 1 PPD for years  Reports inhaler seems to be helping symptoms the most but still having wheezing and cough  Can hear herself wheezing and wakes in the middle of the night w/ wheeze and cough  Cough varies b/w productive with mucous and clear/thin secretions  Would like maintenance inhaler to prevent repetitive flare ups of SOB and wheezing       Prior to Admission medications    Medication Sig Start Date End Date Taking?  Authorizing Provider   ProAir HFA 90 mcg/actuation inhaler INHALE 2 PUFFS BY MOUTH EVERY 4 HOURS AS NEEDED DYSPNEA 12/2/20  Yes Provider, Historical   amoxicillin (AMOXIL) 875 mg tablet TAKE 1 TABLET BY MOUTH TWICE DAILY FOR INFECTION FOR 10 DAYS 12/2/20  Yes Provider, Historical   methylPREDNISolone (MEDROL DOSEPACK) 4 mg tablet TAKE BY MOUTH AS DIRECTED ON INSIDE OF PACKAGE 12/2/20  Yes Provider, Historical   gabapentin (NEURONTIN) 300 mg capsule Take 300 mg by mouth three (3) times daily. Yes Provider, Historical   cetirizine (ZYRTEC) 10 mg tablet Take 1 Tab by mouth daily. 12/7/20  Yes Derick Lawrence NP   Symbicort 160-4.5 mcg/actuation HFAA Take 2 Puffs by inhalation two (2) times a day. 12/7/20  Yes Manny WOLFE NP   topiramate (TOPAMAX) 50 mg tablet Take 1 Tab by mouth two (2) times a day. 12/5/18  Yes Lisa Ferrera MD   topiramate (TOPAMAX) 100 mg tablet Take 1 Tab by mouth two (2) times a day. 12/3/18  Yes Lisa Ferrera MD   SUMAtriptan (IMITREX) 100 mg tablet Take 100 mg by mouth once as needed for Migraine. Yes Provider, Historical   naproxen (NAPROSYN) 500 mg tablet Take 1 Tab by mouth two (2) times daily (with meals). 5/2/18  Yes Omi Davis MD     No Known Allergies    Patient Active Problem List    Diagnosis Date Noted    Mixed simple and mucopurulent chronic bronchitis ([facility] [address] 12/07/2020    Seizure ([facility] [address] 05/02/2018    Migraine 05/02/2018       ROS - negative except as listed above in the HPI      Objective:   Vital Signs: (As obtained by patient/caregiver at home)  There were no vitals taken for this visit. Physical Exam:   General: alert, cooperative, no distress   Mental  status: normal mood, behavior, speech, dress, motor activity, and thought processes, able to follow commands   HEENT: normocephalic, atraumatic    Eyes:  extraocular movements intact, sclera normal, no visible discharge   Ears:  external ears normal   Mouth/Throat:  mucous memrates appear moist    Neck: no visualized mass   Resp: respiratory effort is normal, breathing appears non-labored, no visualized signs of respiratory distress   Neuro: no gross deficits   Skin: no discoloration or lesions of concern on visible areas   Psychiatric: normal affect, consistent with stated mood, no evidence of hallucinations      Additional exam findings:      Assessment & Plan:   Diagnoses and all orders for this visit:    1.  Mixed simple and mucopurulent chronic bronchitis (HCC)  -     Symbicort 160-4.5 mcg/actuation HFAA; Take 2 Puffs by inhalation two (2) times a day. - New Rx, advised on use and SE/ADRs. Discussed this is maintenance inhaler to be used daily, albuterol is PRN  - Discussed nature of COPD and encouraged smoking cessation   - F/u if no improvement or worsening symptoms     2. Environmental and seasonal allergies  -     cetirizine (ZYRTEC) 10 mg tablet; Take 1 Tab by mouth daily.  - New Rx, advised on use and SE/ADRs    Follow-up and Dispositions    · Return if symptoms worsen or fail to improve. I spent at least 15 minutes with this established patient, and >50% of the time was spent counseling and/or coordinating care regarding chronic cough/wheezing      We discussed the expected course, resolution and complications of the diagnosis(es) in detail. Medication risks, benefits, costs, interactions, and alternatives were discussed as indicated. I advised her to contact the office if her condition worsens, changes or fails to improve as anticipated. She expressed understanding with the diagnosis(es) and plan. Freedom Couch is a 38 y.o. female being evaluated by a video visit encounter for concerns as above. A caregiver was present when appropriate. Due to this being a TeleHealth encounter (During Critical access hospital- public health emergency), evaluation of the following organ systems was limited: Vitals/Constitutional/EENT/Resp/CV/GI//MS/Neuro/Skin/Heme-Lymph-Imm. Pursuant to the emergency declaration under the [facility], 1135 waiver authority and the [employer] Act, this Virtual  Visit was conducted, with patient's (and/or legal guardian's) consent, to reduce the patient's risk of exposure to COVID-19 and provide necessary medical care. Services were provided through a video synchronous discussion virtually to substitute for in-person clinic visit. Patient and provider were located at their individual homes.         Brittney Gonzales NP

## 2020-12-07 NOTE — PROGRESS NOTES
Chief Complaint   Patient presents with    Allergies     Patient have c/o of allergies that has worsened in the past yr. Have been to several urgent cares throughout the yr. Last visit was last week,pt was dx with asthma-given prednisone,albuterol inhaler,and amoxicillin 875mg. Pt covid testing was negative. Pt would like to discuss daily medication for allergies. Pt notes wheezing,nasal congestion,SOB-pt states sx are worse in the morning.

## 2021-04-19 ENCOUNTER — OFFICE VISIT (OUTPATIENT)
Dept: FAMILY MEDICINE CLINIC | Age: 41
End: 2021-04-19
Payer: COMMERCIAL

## 2021-04-19 VITALS
HEIGHT: 66 IN | OXYGEN SATURATION: 99 % | BODY MASS INDEX: 23.14 KG/M2 | RESPIRATION RATE: 20 BRPM | SYSTOLIC BLOOD PRESSURE: 116 MMHG | WEIGHT: 144 LBS | DIASTOLIC BLOOD PRESSURE: 75 MMHG | HEART RATE: 84 BPM | TEMPERATURE: 97.8 F

## 2021-04-19 DIAGNOSIS — R56.9 SEIZURE (HCC): ICD-10-CM

## 2021-04-19 DIAGNOSIS — R73.9 ELEVATED BLOOD SUGAR: Primary | ICD-10-CM

## 2021-04-19 DIAGNOSIS — M72.2 PLANTAR FASCIITIS: ICD-10-CM

## 2021-04-19 PROCEDURE — 99214 OFFICE O/P EST MOD 30 MIN: CPT | Performed by: FAMILY MEDICINE

## 2021-04-19 RX ORDER — DICLOFENAC SODIUM 75 MG/1
75 TABLET, DELAYED RELEASE ORAL 2 TIMES DAILY
Qty: 60 TAB | Refills: 2 | Status: SHIPPED | OUTPATIENT
Start: 2021-04-19 | End: 2021-05-03

## 2021-04-19 NOTE — PROGRESS NOTES
Patient here for left heel spur and plantar fasciitis. [facility] to [facility], was put on Prednisone, after a couple days, allergic reaction. Still c/o left foot pain, 2/10 today. Usually hurts worse when walking. Patient also states her blood sugars might be fluctuating. After eating m&m candies and a coke, bs 159. Fasting bs the next morning was 123. She had diabetes in the family. During the episode of 158, she had chills. 1. Have you been to the ER, urgent care clinic since your last visit? Hospitalized since your last visit? Yes Where: [facility]  4/3/2021    2. Have you seen or consulted any other health care providers outside of the [address] [place] [address] since your last visit? Include any pap smears or colon screening. No            Chief Complaint   Patient presents with    Labs     sugar running up after sweets    Allergic Reaction     reaction to prednisone, heel spur inflamed     She is a 42 y.o. female who presents for evalution. Reviewed PmHx, RxHx, FmHx, SocHx, AllgHx and updated and dated in the chart. Patient Active Problem List    Diagnosis    Mixed simple and mucopurulent chronic bronchitis (HCC)    Seizure (HCC)    Migraine       Review of Systems - negative except as listed above in the HPI    Objective:     Vitals:    04/19/21 1055   BP: 116/75   Pulse: 84   Resp: 20   Temp: 97.8 °F (36.6 °C)   SpO2: 99%   Weight: 144 lb (65.3 kg)   Height: 5' 6\" (1.676 m)     Physical Examination: General appearance - alert, well appearing, and in no distress  Chest - clear to auscultation, no wheezes, rales or rhonchi, symmetric air entry  Heart - normal rate, regular rhythm, normal S1, S2, no murmurs, rubs, clicks or gallops    Assessment/ Plan:   Diagnoses and all orders for this visit:    1. Elevated blood sugar  -     LIPID PANEL; Future  -     METABOLIC PANEL, COMPREHENSIVE; Future  -     CBC WITH AUTOMATED DIFF; Future  -     TSH 3RD GENERATION;  Future  -     HEMOGLOBIN A1C WITH EAG; Future  -dwp diet    2. Seizure ([address]  -stable    3. Plantar fasciitis  -off steroids now  -? Source of inc sugars  -add Voltaren bid since could not tolerated steroids       Follow-up and Dispositions    · Return in about 6 months (around 10/19/2021). I have discussed the diagnosis with the patient and the intended plan as seen in the above orders. The patient understands and agrees with the plan. The patient has received an after-visit summary and questions were answered concerning future plans. Medication Side Effects and Warnings were discussed with patient  Patient Labs were reviewed and or requested:  Patient Past Records were reviewed and or requested    Mili Romero M.D. There are no Patient Instructions on file for this visit.

## 2021-04-20 LAB
ALBUMIN SERPL-MCNC: 4.2 G/DL (ref 3.5–5)
ALBUMIN/GLOB SERPL: 1.4 {RATIO} (ref 1.1–2.2)
ALP SERPL-CCNC: 70 U/L (ref 45–117)
ALT SERPL-CCNC: 17 U/L (ref 12–78)
ANION GAP SERPL CALC-SCNC: 6 MMOL/L (ref 5–15)
AST SERPL-CCNC: 12 U/L (ref 15–37)
BASOPHILS # BLD: 0.1 K/UL (ref 0–0.1)
BASOPHILS NFR BLD: 1 % (ref 0–1)
BILIRUB SERPL-MCNC: 0.4 MG/DL (ref 0.2–1)
BUN SERPL-MCNC: 9 MG/DL (ref 6–20)
BUN/CREAT SERPL: 13 (ref 12–20)
CALCIUM SERPL-MCNC: 9.2 MG/DL (ref 8.5–10.1)
CHLORIDE SERPL-SCNC: 108 MMOL/L (ref 97–108)
CHOLEST SERPL-MCNC: 212 MG/DL
CO2 SERPL-SCNC: 25 MMOL/L (ref 21–32)
CREAT SERPL-MCNC: 0.67 MG/DL (ref 0.55–1.02)
DIFFERENTIAL METHOD BLD: NORMAL
EOSINOPHIL # BLD: 0.1 K/UL (ref 0–0.4)
EOSINOPHIL NFR BLD: 1 % (ref 0–7)
ERYTHROCYTE [DISTWIDTH] IN BLOOD BY AUTOMATED COUNT: 13.3 % (ref 11.5–14.5)
EST. AVERAGE GLUCOSE BLD GHB EST-MCNC: 108 MG/DL
GLOBULIN SER CALC-MCNC: 3.1 G/DL (ref 2–4)
GLUCOSE SERPL-MCNC: 93 MG/DL (ref 65–100)
HBA1C MFR BLD: 5.4 % (ref 4–5.6)
HCT VFR BLD AUTO: 41.5 % (ref 35–47)
HDLC SERPL-MCNC: 68 MG/DL
HDLC SERPL: 3.1 {RATIO} (ref 0–5)
HGB BLD-MCNC: 13.1 G/DL (ref 11.5–16)
IMM GRANULOCYTES # BLD AUTO: 0 K/UL (ref 0–0.04)
IMM GRANULOCYTES NFR BLD AUTO: 0 % (ref 0–0.5)
LDLC SERPL CALC-MCNC: 124.8 MG/DL (ref 0–100)
LIPID PROFILE,FLP: ABNORMAL
LYMPHOCYTES # BLD: 1.6 K/UL (ref 0.8–3.5)
LYMPHOCYTES NFR BLD: 18 % (ref 12–49)
MCH RBC QN AUTO: 29.2 PG (ref 26–34)
MCHC RBC AUTO-ENTMCNC: 31.6 G/DL (ref 30–36.5)
MCV RBC AUTO: 92.6 FL (ref 80–99)
MONOCYTES # BLD: 0.7 K/UL (ref 0–1)
MONOCYTES NFR BLD: 8 % (ref 5–13)
NEUTS SEG # BLD: 6.4 K/UL (ref 1.8–8)
NEUTS SEG NFR BLD: 72 % (ref 32–75)
NRBC # BLD: 0 K/UL (ref 0–0.01)
NRBC BLD-RTO: 0 PER 100 WBC
PLATELET # BLD AUTO: 246 K/UL (ref 150–400)
PMV BLD AUTO: 11.4 FL (ref 8.9–12.9)
POTASSIUM SERPL-SCNC: 5 MMOL/L (ref 3.5–5.1)
PROT SERPL-MCNC: 7.3 G/DL (ref 6.4–8.2)
RBC # BLD AUTO: 4.48 M/UL (ref 3.8–5.2)
SODIUM SERPL-SCNC: 139 MMOL/L (ref 136–145)
TRIGL SERPL-MCNC: 96 MG/DL (ref ?–150)
TSH SERPL DL<=0.05 MIU/L-ACNC: 1.94 UIU/ML (ref 0.36–3.74)
VLDLC SERPL CALC-MCNC: 19.2 MG/DL
WBC # BLD AUTO: 8.8 K/UL (ref 3.6–11)

## 2022-03-19 PROBLEM — R56.9 SEIZURE (HCC): Status: ACTIVE | Noted: 2018-05-02

## 2022-03-19 PROBLEM — G43.909 MIGRAINE: Status: ACTIVE | Noted: 2018-05-02

## 2022-03-19 PROBLEM — J41.8 MIXED SIMPLE AND MUCOPURULENT CHRONIC BRONCHITIS (HCC): Status: ACTIVE | Noted: 2020-12-07

## 2023-05-10 RX ORDER — TOPIRAMATE 100 MG/1
TABLET, FILM COATED ORAL 2 TIMES DAILY
COMMUNITY
Start: 2018-12-03

## 2023-05-10 RX ORDER — CETIRIZINE HYDROCHLORIDE 10 MG/1
TABLET ORAL DAILY
COMMUNITY
Start: 2020-12-07

## 2023-05-10 RX ORDER — SUMATRIPTAN 100 MG/1
TABLET, FILM COATED ORAL
COMMUNITY

## 2023-05-10 RX ORDER — BUDESONIDE AND FORMOTEROL FUMARATE DIHYDRATE 160; 4.5 UG/1; UG/1
2 AEROSOL RESPIRATORY (INHALATION) 2 TIMES DAILY
COMMUNITY
Start: 2020-12-07

## 2023-05-10 RX ORDER — ALBUTEROL SULFATE 90 UG/1
AEROSOL, METERED RESPIRATORY (INHALATION)
COMMUNITY
Start: 2020-12-02

## 2023-05-10 RX ORDER — TOPIRAMATE 50 MG/1
TABLET, FILM COATED ORAL 2 TIMES DAILY
COMMUNITY
Start: 2018-12-05

## 2023-05-10 RX ORDER — GABAPENTIN 300 MG/1
CAPSULE ORAL 3 TIMES DAILY
COMMUNITY